# Patient Record
Sex: MALE | Race: WHITE | NOT HISPANIC OR LATINO | Employment: FULL TIME | URBAN - METROPOLITAN AREA
[De-identification: names, ages, dates, MRNs, and addresses within clinical notes are randomized per-mention and may not be internally consistent; named-entity substitution may affect disease eponyms.]

---

## 2017-01-04 ENCOUNTER — PATIENT MESSAGE (OUTPATIENT)
Dept: NEUROLOGY | Facility: HOSPITAL | Age: 62
End: 2017-01-04

## 2017-03-02 ENCOUNTER — LAB VISIT (OUTPATIENT)
Dept: LAB | Facility: HOSPITAL | Age: 62
End: 2017-03-02
Attending: INTERNAL MEDICINE
Payer: COMMERCIAL

## 2017-03-02 DIAGNOSIS — C18.7 ADENOCARCINOMA OF SIGMOID COLON: ICD-10-CM

## 2017-03-02 LAB
ALBUMIN SERPL BCP-MCNC: 3.9 G/DL
ALP SERPL-CCNC: 93 U/L
ALT SERPL W/O P-5'-P-CCNC: 20 U/L
ANION GAP SERPL CALC-SCNC: 11 MMOL/L
AST SERPL-CCNC: 16 U/L
BILIRUB SERPL-MCNC: 0.5 MG/DL
BUN SERPL-MCNC: 10 MG/DL
CALCIUM SERPL-MCNC: 9.8 MG/DL
CEA SERPL-MCNC: 2.6 NG/ML
CHLORIDE SERPL-SCNC: 106 MMOL/L
CO2 SERPL-SCNC: 21 MMOL/L
CREAT SERPL-MCNC: 1.1 MG/DL
ERYTHROCYTE [DISTWIDTH] IN BLOOD BY AUTOMATED COUNT: 13.4 %
EST. GFR  (AFRICAN AMERICAN): >60 ML/MIN/1.73 M^2
EST. GFR  (NON AFRICAN AMERICAN): >60 ML/MIN/1.73 M^2
GLUCOSE SERPL-MCNC: 83 MG/DL
HCT VFR BLD AUTO: 52 %
HGB BLD-MCNC: 17.8 G/DL
MCH RBC QN AUTO: 32.1 PG
MCHC RBC AUTO-ENTMCNC: 34.2 %
MCV RBC AUTO: 94 FL
NEUTROPHILS # BLD AUTO: 5.9 K/UL
PLATELET # BLD AUTO: 244 K/UL
PMV BLD AUTO: 10.2 FL
POTASSIUM SERPL-SCNC: 4 MMOL/L
PROT SERPL-MCNC: 7.4 G/DL
RBC # BLD AUTO: 5.55 M/UL
SODIUM SERPL-SCNC: 138 MMOL/L
WBC # BLD AUTO: 9.13 K/UL

## 2017-03-02 PROCEDURE — 82378 CARCINOEMBRYONIC ANTIGEN: CPT

## 2017-03-02 PROCEDURE — 36415 COLL VENOUS BLD VENIPUNCTURE: CPT | Mod: PO

## 2017-03-02 PROCEDURE — 80053 COMPREHEN METABOLIC PANEL: CPT | Mod: PO

## 2017-03-02 PROCEDURE — 85027 COMPLETE CBC AUTOMATED: CPT | Mod: PO

## 2017-03-03 ENCOUNTER — OFFICE VISIT (OUTPATIENT)
Dept: NEUROLOGY | Facility: HOSPITAL | Age: 62
End: 2017-03-03
Attending: INTERNAL MEDICINE
Payer: COMMERCIAL

## 2017-03-03 VITALS
BODY MASS INDEX: 31.34 KG/M2 | SYSTOLIC BLOOD PRESSURE: 125 MMHG | HEIGHT: 66 IN | TEMPERATURE: 97 F | WEIGHT: 195 LBS | DIASTOLIC BLOOD PRESSURE: 80 MMHG | HEART RATE: 73 BPM

## 2017-03-03 DIAGNOSIS — Z09 CHEMOTHERAPY FOLLOW-UP EXAMINATION: ICD-10-CM

## 2017-03-03 DIAGNOSIS — C77.9 METASTATIC ADENOCARCINOMA TO LYMPH NODE: ICD-10-CM

## 2017-03-03 DIAGNOSIS — K76.9 LIVER LESION: ICD-10-CM

## 2017-03-03 DIAGNOSIS — Z72.0 TOBACCO USE: ICD-10-CM

## 2017-03-03 DIAGNOSIS — C18.7 ADENOCARCINOMA OF SIGMOID COLON: Primary | ICD-10-CM

## 2017-03-03 PROCEDURE — 99214 OFFICE O/P EST MOD 30 MIN: CPT | Mod: ,,, | Performed by: INTERNAL MEDICINE

## 2017-03-03 PROCEDURE — 1160F RVW MEDS BY RX/DR IN RCRD: CPT | Mod: ,,, | Performed by: INTERNAL MEDICINE

## 2017-03-03 PROCEDURE — 99214 OFFICE O/P EST MOD 30 MIN: CPT | Performed by: INTERNAL MEDICINE

## 2017-03-03 NOTE — MR AVS SNAPSHOT
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  Call for this free service at [phone].             Medications                Verify that the below list of medications is an accurate representation of the medications you are currently taking.  If none reported, the list may be blank. If incorrect, please contact your healthcare provider. Carry this list with you in case of emergency.                Clinical Reference Information           Your Vitals Were     BP Pulse Temp Height Weight BMI    125/80 73 97.1 °F (36.2 °C) (Oral) 5' 6" (1.676 m) 88.5 kg (195 lb) 31.47 kg/m2      Blood Pressure          Most Recent Value    BP  125/80      Allergies as of 3/3/2017     Rice      Immunizations Administered on Date of Encounter - 3/3/2017     None      Orders Placed During Today's Visit     Future Labs/Procedures Expected by Expires    CT Abdomen Pelvis With Contrast  3/3/2017 3/3/2018    "   Instructions    F/U in 3 months    CT before visit       Smoking Cessation     If you would like to quit smoking:   You may be eligible for free services if you are a [place] resident and started smoking cigarettes before September 1, 1988.  Call the Smoking Cessation [employer] (SCT) toll free at (206) 816-8225 or (056) 204-3353.   Call 1-800-QUIT-NOW if you do not meet the above criteria.            Language Assistance Services     ATTENTION: Language assistance services are available, free of charge. Please call [phone].      ATENCIÓN: Si habla español, tiene a hagan disposición servicios gratuitos de asistencia lingüística. Llame al [phone].     [facility] Ý: N?u b?n nói Ti?ng Vi?t, có các d?ch v? h? tr? ngôn ng? mi?n phí dành cho b?n. G?i s? [phone].         [facility] complies with applicable Federal civil rights laws and does not discriminate on the basis of race, color, national origin, age, disability, or sex.

## 2017-03-03 NOTE — LETTER
March 3, 2017        Juan Church MD  [address]  [address]  [place] [address]             [facility] [address]  [place] [address]  Phone: [phone]  Fax: [phone]   Patient: Stu Lama   MR Number: [id number]   YOB: 1955   Date of Visit: 3/3/2017       Dear Dr. Church:    Thank you for referring Stu Lama to me for evaluation. Attached you will find relevant portions of my assessment and plan of care.    If you have questions, please do not hesitate to call me. I look forward to following Stu Lama along with you.    Sincerely,      Martin Knox, DO, FACP            CC  No Recipients    Enclosure

## 2017-03-03 NOTE — PROGRESS NOTES
[facility]OLAN[facility]:  [facility]  A collaboration between [facility] and [facility]    PATIENT: Stu Lama  MRN: [id number]  DATE: 3/3/2017      Diagnosis:   1. Adenocarcinoma of sigmoid colon    2. 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 Chemotherapy follow-up examination    4. Liver lesion    5. Tobacco use        Chief Complaint: Follow-up (review scans)      Oncologic History:      Oncologic History Sigmoid adenocarcinoma diagnosed 11/2015    Oncologic Treatment Low anterior resection 12/14/2015  XELOX initiated 2/23/16 (Oxaliplatin discontinued following 1 cycle due to severe neuropathy; Capecitabine dose reduced by 25% due to tremors). completed 8/2016     Pathology Moderately differentiated adenocarcinoma, positive lymphovascular invasion, positive perineural invasion, pT3, N1a          Subjective:    Interval History: Mr. Lama is a 61 y.o. male seen in follow up for colon cancer.  He states he generally feels well.  Still with ongoing numbness to his left hand and feet.  Despite this, he is fully active and was out fishing recently.  Denies any blood in stool or dark stools.  He is without other complaints.  He still continues to smoke.    His history dates to 11/15 when he underwent colonoscopy after having several weeks of abdominal pain and weight loss.  At that time he had also complained of intermittent hematochezia.  He was found to have a fungating partially obstructing mass in the sigmoid colon.  On 12/13/15 he underwent a low anterior resection.  Pathology from this revealed a moderately differentiated adenocarcinoma, 4 cm, lymphovascular invasion and perineural invasion with 1 out of 17 lymph nodes positive for metastatic disease. He was pathologically staged T3, N1.  He had followed up with oncology initially, however, wanted a second opinion at [facility].  He was initiated on XELOX on 2/23/16 and  required capecitabine dose reduction and oxaliplatin discontinuation following 1 cycle.  He completed 6 months of treatment in 8/2016.    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  He has a 112.50 pack-year smoking history. He does not have any smokeless tobacco history on file. He reports that he drinks about 0.6 oz of alcohol per week     Allergies:  Review of patient's allergies indicates:   Allergen Reactions    Rice Nausea And Vomiting       Medications:  No current outpatient prescriptions on file.     No current facility-administered medications for this visit.        Review of Systems   Constitutional: Negative for appetite change, chills, fatigue, fever and unexpected weight change.   HENT: Negative for dental problem, sinus pressure and sneezing.    Eyes: Negative for visual disturbance.   Respiratory: Negative for cough, choking and chest tightness.    Cardiovascular: Negative for chest pain and leg swelling.   Gastrointestinal: Negative for abdominal pain, blood in stool, constipation, diarrhea and nausea.   Genitourinary: Negative for difficulty urinating and dysuria.   Musculoskeletal: Negative for arthralgias and back pain.   Skin: Negative for rash and wound.   Neurological: Positive for numbness. Negative for  "dizziness, light-headedness and headaches.   Hematological: Negative for adenopathy. Does not bruise/bleed easily.   Psychiatric/Behavioral: Negative for sleep disturbance. The patient is not nervous/anxious.        ECOG Performance Status: 0   Objective:      Vitals:   Vitals:    03/03/17 0808   BP: 125/80   Pulse: 73   Temp: 97.1 °F (36.2 °C)   TempSrc: Oral   Weight: 88.5 kg (195 lb)   Height: 5' 6" (1.676 m)     BMI: Body mass index is 31.47 kg/(m^2).    Physical Exam   Constitutional: He is oriented to person, place, and time. He appears well-developed and well-nourished. No distress.   HENT:   Head: Normocephalic and atraumatic.   Eyes: Pupils are equal, round, and reactive to light. No scleral icterus.   Neck: Normal range of motion. Neck supple.   Cardiovascular: Normal rate and regular rhythm.    Pulmonary/Chest: Effort normal and breath sounds normal. No respiratory distress.   Abdominal: Soft. He exhibits no distension. There is no tenderness.   Musculoskeletal: Normal range of motion. He exhibits no edema.   Lymphadenopathy:     He has no cervical adenopathy.   Neurological: He is alert and oriented to person, place, and time. No cranial nerve deficit.   Skin: Skin is warm and dry.   Psychiatric: He has a normal mood and affect. His behavior is normal.       Laboratory Data:  Lab Visit on 03/02/2017   Component Date Value Ref Range Status    WBC 03/02/2017 9.13  3.90 - 12.70 K/uL Final    RBC 03/02/2017 5.55  4.60 - 6.20 M/uL Final    Hemoglobin 03/02/2017 17.8  14.0 - 18.0 g/dL Final    Hematocrit 03/02/2017 52.0  40.0 - 54.0 % Final    MCV 03/02/2017 94  82 - 98 fL Final    MCH 03/02/2017 32.1* 27.0 - 31.0 pg Final    MCHC 03/02/2017 34.2  32.0 - 36.0 % Final    RDW 03/02/2017 13.4  11.5 - 14.5 % Final    Platelets 03/02/2017 244  150 - 350 K/uL Final    MPV 03/02/2017 10.2  9.2 - 12.9 fL Final    Gran # 03/02/2017 5.9  1.8 - 7.7 K/uL Final    Comment: The ANC is based on a white cell " differential from an   automated cell counter. It has not been microscopically   reviewed for the presence of abnormal cells. Clinical   correlation is required.      Sodium 03/02/2017 138  136 - 145 mmol/L Final    Potassium 03/02/2017 4.0  3.5 - 5.1 mmol/L Final    Chloride 03/02/2017 106  95 - 110 mmol/L Final    CO2 03/02/2017 21* 23 - 29 mmol/L Final    Glucose 03/02/2017 83  70 - 110 mg/dL Final    BUN, Bld 03/02/2017 10  8 - 23 mg/dL Final    Creatinine 03/02/2017 1.1  0.5 - 1.4 mg/dL Final    Calcium 03/02/2017 9.8  8.7 - 10.5 mg/dL Final    Total Protein 03/02/2017 7.4  6.0 - 8.4 g/dL Final    Albumin 03/02/2017 3.9  3.5 - 5.2 g/dL Final    Total Bilirubin 03/02/2017 0.5  0.1 - 1.0 mg/dL Final    Comment: For infants and newborns, interpretation of results should be based  on gestational age, weight and in agreement with clinical  observations.  Premature Infant recommended reference ranges:  Up to 24 hours.............<8.0 mg/dL  Up to 48 hours............<12.0 mg/dL  3-5 days..................<15.0 mg/dL  6-29 days.................<15.0 mg/dL      Alkaline Phosphatase 03/02/2017 93  55 - 135 U/L Final    AST 03/02/2017 16  10 - 40 U/L Final    ALT 03/02/2017 20  10 - 44 U/L Final    Anion Gap 03/02/2017 11  8 - 16 mmol/L Final    eGFR if African American 03/02/2017 >60  >60 mL/min/1.73 m^2 Final    eGFR if non African American 03/02/2017 >60  >60 mL/min/1.73 m^2 Final    Comment: Calculation used to obtain the estimated glomerular filtration  rate (eGFR) is the CKD-EPI equation. Since race is unknown   in our information system, the eGFR values for   -American and Non--American patients are given   for each creatinine result.      CEA 03/02/2017 2.6  0.0 - 5.0 ng/mL Final    Comment: CEA Normal Range:  Non-Smokers: 0-3.0 ng/mL  Smokers:     0-5.0 ng/mL         Imaging:    CT 9/9/16  On the precontrast portion of the scan there is an ill-defined hypodense lesion within the  posterior segment right lobe of the liver measuring about 4.2 x 3.3 cm. On the arterial portion of the scan it demonstrates peripheral nodular enhancement. On the venous phase there is some mild nodular central enhancement. On the delayed images, this mass is mildly hyperdense to surrounding hepatic parenchyma with some central hypodensity. It is unchanged in size compared to previous CT.  There are a couple of additional subcentimeter hypodense foci noted medial dome of the liver seen only on the venous portion of the scan. There is an additional subcentimeter hypodense lesion within the periphery of the right lobe of the liver with mild peripheral enhancement.    Previously noted findings including arteriosclerotic disease, 14 mm right adrenal nodule, right extrarenal pelvis appearance suggestive of partial UPJ obstruction, mild diffuse bladder wall thickening and rounded hyperdense lesion layering in the dependent portion of the bladder measuring about 2.3 x 1.6 cm in diameter. No new significant osseous findings.   Impression    1. Stable appearance to enhancing mass in the posterior segment right lobe of the liver. Additional subcentimeter hypodense hepatic lesions with minimal or mild peripheral enhancement. Metastatic disease not excluded. MRI of the liver with Eovist is recommended in further evaluation in this patient with adenocarcinoma of the colon. A PET scan may also be of benefit in evaluating the dominant lesion in the right lobe of the liver.  2. Chronic findings detailed above are overall stable.                  Assessment:       1. Adenocarcinoma of sigmoid colon    2. 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 Chemotherapy follow-up examination    4. Liver lesion    5. Tobacco use           Plan:     Mr. Lama is doing well from an oncologic standpoint.  Recent colonoscopy did not reveal new disease.  Additionally he had a follow-up visit with urology for thickening on the bladder which was  seen on CT which was also nonrevealing for a malignancy.  He has 2 small liver lesions which are consistent with hemangioma but will continue to follow and will repeat a CT in 3 months.  I have had an extensive discussion with him today concerning smoking cessation and strongly encouraged him to smoke.  He is not yet ready but I have told him we have a smoking cessation program and if he changes his mind let us know.  Plan to see back in 3 months or sooner if needed.    Martin Knox DO, [facility]  Hematology & Oncology, [facility]  [address]  [place]  [address]  ph. [phone]; [phone]  fax. [phone]    25 minutes were spent in coordination of patient's care, record review and counseling.  More than 50% of the time was face-to-face.

## 2017-06-02 ENCOUNTER — TELEPHONE (OUTPATIENT)
Dept: RADIOLOGY | Facility: HOSPITAL | Age: 62
End: 2017-06-02

## 2017-06-02 ENCOUNTER — TELEPHONE (OUTPATIENT)
Dept: NEUROLOGY | Facility: HOSPITAL | Age: 62
End: 2017-06-02

## 2017-06-02 DIAGNOSIS — C18.9 ADENOCARCINOMA, COLON: Primary | ICD-10-CM

## 2017-06-02 NOTE — TELEPHONE ENCOUNTER
----- Message from Francisca Ingram sent at 6/2/2017  7:59 AM CDT -----  Regarding: CT Order  Mr Lama is scheduled fo CT Abdomen Pelvis With contrast tomorrow for Triple Phase Liver. There order that we use here for a Triple Phase Liver is CT ABDOMEN PELVIS WITH AND WITHOUT CONTRAST. Please change and link the new order to his current appointment.    Thanks,  Gema  [id number]

## 2017-06-05 ENCOUNTER — HOSPITAL ENCOUNTER (OUTPATIENT)
Dept: RADIOLOGY | Facility: HOSPITAL | Age: 62
Discharge: HOME | End: 2017-06-05
Attending: INTERNAL MEDICINE
Payer: COMMERCIAL

## 2017-06-05 DIAGNOSIS — C18.9 ADENOCARCINOMA, COLON: ICD-10-CM

## 2017-06-05 PROCEDURE — 25500020 PHARM REV CODE 255: Mod: PO | Performed by: INTERNAL MEDICINE

## 2017-06-05 PROCEDURE — 74178 CT ABD&PLV WO CNTR FLWD CNTR: CPT | Mod: TC,PO

## 2017-06-05 PROCEDURE — 74178 CT ABD&PLV WO CNTR FLWD CNTR: CPT | Mod: 26,,, | Performed by: RADIOLOGY

## 2017-06-05 RX ADMIN — IOHEXOL 15 ML: 350 INJECTION, SOLUTION INTRAVENOUS at 07:06

## 2017-06-05 RX ADMIN — IOHEXOL 100 ML: 350 INJECTION, SOLUTION INTRAVENOUS at 08:06

## 2017-06-16 ENCOUNTER — OFFICE VISIT (OUTPATIENT)
Dept: NEUROLOGY | Facility: HOSPITAL | Age: 62
End: 2017-06-16
Attending: INTERNAL MEDICINE
Payer: COMMERCIAL

## 2017-06-16 VITALS
WEIGHT: 196.19 LBS | SYSTOLIC BLOOD PRESSURE: 122 MMHG | DIASTOLIC BLOOD PRESSURE: 85 MMHG | HEIGHT: 67 IN | HEART RATE: 74 BPM | TEMPERATURE: 97 F | BODY MASS INDEX: 30.79 KG/M2

## 2017-06-16 DIAGNOSIS — G62.0 NEUROPATHY DUE TO CHEMOTHERAPEUTIC DRUG: ICD-10-CM

## 2017-06-16 DIAGNOSIS — T45.1X5A NEUROPATHY DUE TO CHEMOTHERAPEUTIC DRUG: ICD-10-CM

## 2017-06-16 DIAGNOSIS — C18.7 ADENOCARCINOMA OF SIGMOID COLON: Primary | ICD-10-CM

## 2017-06-16 DIAGNOSIS — R91.1 PULMONARY NODULE: ICD-10-CM

## 2017-06-16 PROCEDURE — 99214 OFFICE O/P EST MOD 30 MIN: CPT | Mod: ,,, | Performed by: INTERNAL MEDICINE

## 2017-06-16 PROCEDURE — 99214 OFFICE O/P EST MOD 30 MIN: CPT | Performed by: INTERNAL MEDICINE

## 2017-06-16 NOTE — LETTER
June 16, 2017        Juan Church MD  [address]  [address]  [place] [address]             [facility] [address]  [place] [address]  Phone: [phone]  Fax: [phone]   Patient: Stu Lama   MR Number: [id number]   YOB: 1955   Date of Visit: 6/16/2017       Dear Dr. Church:    Thank you for referring Stu Lama to me for evaluation. Attached you will find relevant portions of my assessment and plan of care.    If you have questions, please do not hesitate to call me. I look forward to following Stu Lama along with you.    Sincerely,      Martin Knox, DO, FACP            CC  No Recipients    Enclosure

## 2017-06-16 NOTE — PROGRESS NOTES
[place]OLAN[place]:  [place] [facility]  A collaboration between [facility] and [facility]    PATIENT: Stu Lama  MRN: [id number]  DATE: 6/16/2017      Diagnosis:   1. Adenocarcinoma of sigmoid colon    2. Pulmonary nodule    3. Neuropathy due to chemotherapeutic drug        Chief Complaint: Follow-up (follow up w/scans)      Oncologic History:      Oncologic History Sigmoid adenocarcinoma diagnosed 11/2015    Oncologic Treatment Low anterior resection 12/14/2015  XELOX initiated 2/23/16 (Oxaliplatin discontinued following 1 cycle due to severe neuropathy; Capecitabine dose reduced by 25% due to tremors). completed 8/2016     Pathology Moderately differentiated adenocarcinoma, positive lymphovascular invasion, positive perineural invasion, pT3, N1a          Subjective:    Interval History: Mr. Lama is a 61 y.o. male seen in follow up for colon cancer.  He states he generally feels well.  He still has ongoing numbness to his hands and feet which is relatively unchanged.  He has noted some ongoing fatigue throughout the day.  He has no other new complaints.    His history dates to 11/15 when he underwent colonoscopy after having several weeks of abdominal pain and weight loss.  At that time he had also complained of intermittent hematochezia.  He was found to have a fungating partially obstructing mass in the sigmoid colon.  On 12/13/15 he underwent a low anterior resection.  Pathology from this revealed a moderately differentiated adenocarcinoma, 4 cm, lymphovascular invasion and perineural invasion with 1 out of 17 lymph nodes positive for metastatic disease. He was pathologically staged T3, N1.  He had followed up with oncology initially, however, wanted a second opinion at [facility].  He was initiated on XELOX on 2/23/16 and required capecitabine dose reduction and oxaliplatin discontinuation following 1 cycle.  He completed 6  months of treatment in 8/2016.    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  He has a 112.50 pack-year smoking history. He does not have any smokeless tobacco history on file. He reports that he drinks about 0.6 oz of alcohol per week .    Allergies:  Review of patient's allergies indicates:   Allergen Reactions    Rice Nausea And Vomiting       Medications:  No current outpatient prescriptions on file.     No current facility-administered medications for this visit.        Review of Systems   Constitutional: Positive for fatigue. Negative for appetite change, chills, fever and unexpected weight change.   HENT: Negative for dental problem, sinus pressure and sneezing.    Eyes: Negative for visual disturbance.   Respiratory: Negative for cough, choking and chest tightness.    Cardiovascular: Negative for chest pain and leg swelling.   Gastrointestinal: Negative for abdominal pain, blood in stool, constipation, diarrhea and nausea.   Genitourinary: Negative for difficulty urinating and dysuria.   Musculoskeletal: Negative for arthralgias and back pain.   Skin: Negative for rash and wound.   Neurological: Positive for numbness. Negative for dizziness, light-headedness and headaches.   Hematological: Negative for adenopathy. Does not  "bruise/bleed easily.   Psychiatric/Behavioral: Negative for sleep disturbance. The patient is not nervous/anxious.        ECOG Performance Status: 0   Objective:      Vitals:   Vitals:    06/16/17 0823   BP: 122/85   Pulse: 74   Temp: 97.1 °F (36.2 °C)   TempSrc: Oral   Weight: 89 kg (196 lb 3.2 oz)   Height: 5' 7" (1.702 m)     BMI: Body mass index is 30.73 kg/m².    Physical Exam   Constitutional: He is oriented to person, place, and time. He appears well-developed and well-nourished. No distress.   HENT:   Head: Normocephalic and atraumatic.   Eyes: Pupils are equal, round, and reactive to light. No scleral icterus.   Neck: Normal range of motion. Neck supple.   Cardiovascular: Normal rate and regular rhythm.    Pulmonary/Chest: Effort normal and breath sounds normal. No respiratory distress.   Abdominal: Soft. He exhibits no distension. There is no tenderness.   Musculoskeletal: Normal range of motion. He exhibits no edema.   Lymphadenopathy:     He has no cervical adenopathy.   Neurological: He is alert and oriented to person, place, and time. No cranial nerve deficit.   Skin: Skin is warm and dry.   Psychiatric: He has a normal mood and affect. His behavior is normal.       Laboratory Data:  Lab Visit on 06/05/2017   Component Date Value Ref Range Status    WBC 06/05/2017 8.89  3.90 - 12.70 K/uL Final    RBC 06/05/2017 5.50  4.60 - 6.20 M/uL Final    Hemoglobin 06/05/2017 17.5  14.0 - 18.0 g/dL Final    Hematocrit 06/05/2017 51.6  40.0 - 54.0 % Final    MCV 06/05/2017 94  82 - 98 fL Final    MCH 06/05/2017 31.8* 27.0 - 31.0 pg Final    MCHC 06/05/2017 33.9  32.0 - 36.0 % Final    RDW 06/05/2017 13.6  11.5 - 14.5 % Final    Platelets 06/05/2017 260  150 - 350 K/uL Final    MPV 06/05/2017 10.2  9.2 - 12.9 fL Final    Gran # 06/05/2017 6.1  1.8 - 7.7 K/uL Final    Comment: The ANC is based on a white cell differential from an   automated cell counter. It has not been microscopically   reviewed for the " presence of abnormal cells. Clinical   correlation is required.      Sodium 06/05/2017 138  136 - 145 mmol/L Final    Potassium 06/05/2017 4.2  3.5 - 5.1 mmol/L Final    Chloride 06/05/2017 105  95 - 110 mmol/L Final    CO2 06/05/2017 25  23 - 29 mmol/L Final    Glucose 06/05/2017 94  70 - 110 mg/dL Final    BUN, Bld 06/05/2017 11  8 - 23 mg/dL Final    Creatinine 06/05/2017 1.2  0.5 - 1.4 mg/dL Final    Calcium 06/05/2017 9.4  8.7 - 10.5 mg/dL Final    Total Protein 06/05/2017 7.1  6.0 - 8.4 g/dL Final    Albumin 06/05/2017 3.8  3.5 - 5.2 g/dL Final    Total Bilirubin 06/05/2017 0.4  0.1 - 1.0 mg/dL Final    Comment: For infants and newborns, interpretation of results should be based  on gestational age, weight and in agreement with clinical  observations.  Premature Infant recommended reference ranges:  Up to 24 hours.............<8.0 mg/dL  Up to 48 hours............<12.0 mg/dL  3-5 days..................<15.0 mg/dL  6-29 days.................<15.0 mg/dL      Alkaline Phosphatase 06/05/2017 87  55 - 135 U/L Final    AST 06/05/2017 14  10 - 40 U/L Final    ALT 06/05/2017 23  10 - 44 U/L Final    Anion Gap 06/05/2017 8  8 - 16 mmol/L Final    eGFR if African American 06/05/2017 >60  >60 mL/min/1.73 m^2 Final    eGFR if non African American 06/05/2017 >60  >60 mL/min/1.73 m^2 Final    Comment: Calculation used to obtain the estimated glomerular filtration  rate (eGFR) is the CKD-EPI equation. Since race is unknown   in our information system, the eGFR values for   -American and Non--American patients are given   for each creatinine result.      CEA 06/05/2017 2.4  0.0 - 5.0 ng/mL Final    Comment: CEA Normal Range:  Non-Smokers: 0-3.0 ng/mL  Smokers:     0-5.0 ng/mL         Imaging:    CT 6/5/17  Comparison: 12/19/2016    Technique:  CT of the abdomen and pelvis was obtained.  100 cc of IV Omnipaque 350 was administered.Images of the abdomen were obtained prior to and following  contrast administration.  Multiplanar reconstructions were obtained and reviewed.    Findings:   Visualized Chest: There is a new noncalcified nodular opacity in the periphery of the right lower lobe measuring 4.6 mm as seen on image 17, series 2.  Other previously described nodular opacity in the posterior right lower lobe is unchanged measuring approximately 6 mm.    Abdomen:  Liver: The previously described 3.5 cm lesion in the posterior right hepatic lobe is unchanged.  Lesion again demonstrates peripheral interrupted nodular enhancement with gradual fill-in on delayed acquisitions, in keeping with a hemangioma.  Multiple subcentimeter hypoattenuating foci in the dome of the liver in the right and left hepatic lobes are also unchanged and remain too small to reliably characterize.  No new lesions visualized.  Gallbladder and biliary: Within normal limits.    Spleen: Within normal limits.    Pancreas: Within normal limits.    Adrenals: Slight nodular thickening of the adrenal glands is unchanged    Kidneys: Prominent extrarenal pelvis again noted on the right.  No hydronephrosis or hydroureter.  No enhancing renal masses visualized.    Bowel: Within normal limits.  No evidence of obstruction.  The appendix appears normal.    Peritoneum: No ascites or pneumoperitoneum.    Abdominal adenopathy: None.    Vasculature: Ectasia of the distal abdominal aorta is unchanged measuring up to 2.9 cm in maximum diameter just proximal to the bifurcation.    Pelvis:    Urinary bladder: Diffuse urinary bladder wall thickening is again demonstrated.  Rounded hyperattenuating foci at the apex of the urinary bladder is unchanged measuring approximately 15 mm in diameter.    Prostate and seminal vesicles: Within normal limits.    Pelvic adenopathy: None.    Bones: No acute findings.    Miscellaneous: None.   Impression       1.  Unchanged appearance of previously described lesion in the posterior right hepatic lobe which demonstrates  enhancement features in keeping with hemangioma.    2.  Numerous subcentimeter hypoattenuating foci throughout the liver are unchanged and remain too small to reliably characterize.    3.  New noncalcified pulmonary nodule in the right lower lobe measuring approximately 4.6 mm.  Metastatic disease not excluded.  Other previously described right lower lobe pulmonary nodular opacity is unchanged.  Consider further correlation with dedicated CT of the chest.    4.  Other stable findings as above.                      Assessment:       1. Adenocarcinoma of sigmoid colon    2. Pulmonary nodule    3. Neuropathy due to chemotherapeutic drug           Plan:     Mr. Lama is doing relatively well from an oncologic perspective.  Review of his abdominal imaging did not show any new intra-abdominal lesions.  What is concerning is that he has a subcentimeter, noncalcified right lower lobe nodule which was not seen on prior CT imaging.  I would plan to repeat a CT of the chest in another 3 months for further evaluation.  We did have a long discussion today about his fatigue and he does admit to snoring and may have a component of sleep apnea causing this.  He is going to follow back up with his primary care physician.  Follow back up with me in another 3 months or sooner if needed.    Martin Knox DO, [facility]  Hematology & Oncology, [facility]  [address]  [place]  [address]  ph. [phone]; [phone]  fax. [phone]    25 minutes were spent in coordination of patient's care, record review and counseling.  More than 50% of the time was face-to-face.

## 2017-11-27 ENCOUNTER — PATIENT MESSAGE (OUTPATIENT)
Dept: NEUROLOGY | Facility: HOSPITAL | Age: 62
End: 2017-11-27

## 2017-11-29 ENCOUNTER — TELEPHONE (OUTPATIENT)
Dept: NEUROLOGY | Facility: HOSPITAL | Age: 62
End: 2017-11-29

## 2017-11-29 DIAGNOSIS — C18.7 ADENOCARCINOMA OF SIGMOID COLON: Primary | ICD-10-CM

## 2017-12-06 ENCOUNTER — TELEPHONE (OUTPATIENT)
Dept: RADIOLOGY | Facility: HOSPITAL | Age: 62
End: 2017-12-06

## 2017-12-07 ENCOUNTER — HOSPITAL ENCOUNTER (OUTPATIENT)
Dept: RADIOLOGY | Facility: HOSPITAL | Age: 62
Discharge: HOME | End: 2017-12-07
Attending: INTERNAL MEDICINE
Payer: COMMERCIAL

## 2017-12-07 DIAGNOSIS — C18.7 ADENOCARCINOMA OF SIGMOID COLON: ICD-10-CM

## 2017-12-07 PROCEDURE — 74178 CT ABD&PLV WO CNTR FLWD CNTR: CPT | Mod: 26,,, | Performed by: RADIOLOGY

## 2017-12-07 PROCEDURE — 71260 CT THORAX DX C+: CPT | Mod: TC,PO

## 2017-12-07 PROCEDURE — 71260 CT THORAX DX C+: CPT | Mod: 26,,, | Performed by: RADIOLOGY

## 2017-12-07 PROCEDURE — 25500020 PHARM REV CODE 255: Mod: PO | Performed by: INTERNAL MEDICINE

## 2017-12-07 PROCEDURE — 74178 CT ABD&PLV WO CNTR FLWD CNTR: CPT | Mod: TC,PO

## 2017-12-07 RX ADMIN — IOHEXOL 30 ML: 350 INJECTION, SOLUTION INTRAVENOUS at 08:12

## 2017-12-07 RX ADMIN — IOHEXOL 100 ML: 350 INJECTION, SOLUTION INTRAVENOUS at 10:12

## 2017-12-12 ENCOUNTER — OFFICE VISIT (OUTPATIENT)
Dept: NEUROLOGY | Facility: HOSPITAL | Age: 62
End: 2017-12-12
Attending: INTERNAL MEDICINE
Payer: COMMERCIAL

## 2017-12-12 ENCOUNTER — TELEPHONE (OUTPATIENT)
Dept: NEUROLOGY | Facility: HOSPITAL | Age: 62
End: 2017-12-12

## 2017-12-12 VITALS
DIASTOLIC BLOOD PRESSURE: 94 MMHG | WEIGHT: 185.94 LBS | HEART RATE: 95 BPM | HEIGHT: 66 IN | BODY MASS INDEX: 29.88 KG/M2 | TEMPERATURE: 98 F | SYSTOLIC BLOOD PRESSURE: 130 MMHG

## 2017-12-12 DIAGNOSIS — T45.1X5A NEUROPATHY DUE TO CHEMOTHERAPEUTIC DRUG: ICD-10-CM

## 2017-12-12 DIAGNOSIS — G62.0 NEUROPATHY DUE TO CHEMOTHERAPEUTIC DRUG: ICD-10-CM

## 2017-12-12 DIAGNOSIS — R91.1 PULMONARY NODULE: ICD-10-CM

## 2017-12-12 DIAGNOSIS — C18.7 ADENOCARCINOMA OF SIGMOID COLON: Primary | ICD-10-CM

## 2017-12-12 DIAGNOSIS — Z72.0 TOBACCO USE: ICD-10-CM

## 2017-12-12 PROCEDURE — 99213 OFFICE O/P EST LOW 20 MIN: CPT | Performed by: INTERNAL MEDICINE

## 2017-12-12 PROCEDURE — 99214 OFFICE O/P EST MOD 30 MIN: CPT | Mod: ,,, | Performed by: INTERNAL MEDICINE

## 2017-12-12 NOTE — LETTER
December 12, 2017        Juan Church MD  [address]  [address]  [place] [address]             [facility] [address]  [place] [address]  Phone: [phone]  Fax: [phone]   Patient: Stu Lama   MR Number: [id number]   YOB: 1955   Date of Visit: 12/12/2017       Dear Dr. Church:    Thank you for referring Stu Lama to me for evaluation. Attached you will find relevant portions of my assessment and plan of care.    If you have questions, please do not hesitate to call me. I look forward to following Stu Lama along with you.    Sincerely,      Martin Knox, DO, FACP            CC  No Recipients    Enclosure

## 2017-12-12 NOTE — PATIENT INSTRUCTIONS
Follow up with labs and a CT in 6 months    Appointment with Dr. Abilio hollis, subject to change

## 2017-12-12 NOTE — PROGRESS NOTES
NOLANETS:  [facility]  A collaboration between [facility] and [facility]    PATIENT: Stu Lama  MRN: [id number]  DATE: 12/12/2017      Diagnosis:   1. Adenocarcinoma of sigmoid colon    2. Neuropathy due to chemotherapeutic drug    3. Pulmonary nodule    4. Tobacco use        Chief Complaint: Follow-up (review scans and labs)      Oncologic History:      Oncologic History Sigmoid adenocarcinoma diagnosed 11/2015    Oncologic Treatment Low anterior resection 12/14/2015  XELOX initiated 2/23/16 (Oxaliplatin discontinued following 1 cycle due to severe neuropathy; Capecitabine dose reduced by 25% due to tremors). completed 8/2016     Pathology Moderately differentiated adenocarcinoma, positive lymphovascular invasion, positive perineural invasion, pT3, N1a          Subjective:    Interval History: Mr. Lama is a 62 y.o. male seen in follow up for colon cancer.  Since I had seen him last he is undergone knee replacement surgery.  He states he has ongoing pain and difficulty ambulating because of this but this does appear to be improving.  He continues to smoke but has cut down.  He has no other new complaints.    His history dates to 11/15 when he underwent colonoscopy after having several weeks of abdominal pain and weight loss.  At that time he had also complained of intermittent hematochezia.  He was found to have a fungating partially obstructing mass in the sigmoid colon.  On 12/13/15 he underwent a low anterior resection.  Pathology from this revealed a moderately differentiated adenocarcinoma, 4 cm, lymphovascular invasion and perineural invasion with 1 out of 17 lymph nodes positive for metastatic disease. He was pathologically staged T3, N1.  He had followed up with oncology initially, however, wanted a second opinion at [facility].  He was initiated on XELOX on 2/23/16 and required capecitabine dose reduction and  oxaliplatin discontinuation following 1 cycle.  He completed 6 months of treatment in 8/2016.    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  He has a 112.50 pack-year smoking history. He does not have any smokeless tobacco history on file. He reports that he drinks about 0.6 oz of alcohol per week .    Allergies:  Review of patient's allergies indicates:   Allergen Reactions    Rice Nausea And Vomiting       Medications:  No current outpatient prescriptions on file.     No current facility-administered medications for this visit.        Review of Systems   Constitutional: Negative for appetite change, chills, fatigue, fever and unexpected weight change.   HENT: Negative for dental problem, sinus pressure and sneezing.    Eyes: Negative for visual disturbance.   Respiratory: Negative for cough, choking and chest tightness.    Cardiovascular: Negative for chest pain and leg swelling.   Gastrointestinal: Negative for abdominal pain, blood in stool, constipation, diarrhea and nausea.   Genitourinary: Negative for difficulty urinating and dysuria.   Musculoskeletal: Positive for arthralgias. Negative for back pain.   Skin: Negative for rash and wound.   Neurological: Positive for numbness. Negative for dizziness, light-headedness and  "headaches.   Hematological: Negative for adenopathy. Does not bruise/bleed easily.   Psychiatric/Behavioral: Negative for sleep disturbance. The patient is not nervous/anxious.        ECOG Performance Status: 0   Objective:      Vitals:   Vitals:    12/12/17 1031   BP: (!) 130/94   Pulse: 95   Temp: 97.5 °F (36.4 °C)   TempSrc: Oral   Weight: 84.4 kg (185 lb 15.3 oz)   Height: 5' 6" (1.676 m)     BMI: Body mass index is 30.01 kg/m².    Physical Exam   Constitutional: He is oriented to person, place, and time. He appears well-developed and well-nourished. No distress.   HENT:   Head: Normocephalic and atraumatic.   Eyes: Pupils are equal, round, and reactive to light. No scleral icterus.   Neck: Normal range of motion. Neck supple.   Cardiovascular: Normal rate and regular rhythm.    Pulmonary/Chest: Effort normal and breath sounds normal. No respiratory distress.   Abdominal: Soft. He exhibits no distension. There is no tenderness.   Musculoskeletal: Normal range of motion. He exhibits no edema.   Lymphadenopathy:     He has no cervical adenopathy.   Neurological: He is alert and oriented to person, place, and time. No cranial nerve deficit.   Skin: Skin is warm and dry.   Psychiatric: He has a normal mood and affect. His behavior is normal.       Laboratory Data:  Lab Visit on 12/07/2017   Component Date Value Ref Range Status    WBC 12/07/2017 13.04* 3.90 - 12.70 K/uL Final    RBC 12/07/2017 5.01  4.60 - 6.20 M/uL Final    Hemoglobin 12/07/2017 15.9  14.0 - 18.0 g/dL Final    Hematocrit 12/07/2017 47.4  40.0 - 54.0 % Final    MCV 12/07/2017 95  82 - 98 fL Final    MCH 12/07/2017 31.7* 27.0 - 31.0 pg Final    MCHC 12/07/2017 33.5  32.0 - 36.0 g/dL Final    RDW 12/07/2017 13.8  11.5 - 14.5 % Final    Platelets 12/07/2017 309  150 - 350 K/uL Final    MPV 12/07/2017 10.0  9.2 - 12.9 fL Final    Gran # 12/07/2017 8.3* 1.8 - 7.7 K/uL Final    Comment: The ANC is based on a white cell differential from an "   automated cell counter. It has not been microscopically   reviewed for the presence of abnormal cells. Clinical   correlation is required.      Sodium 12/07/2017 139  136 - 145 mmol/L Final    Potassium 12/07/2017 3.8  3.5 - 5.1 mmol/L Final    Chloride 12/07/2017 104  95 - 110 mmol/L Final    CO2 12/07/2017 23  23 - 29 mmol/L Final    Glucose 12/07/2017 86  70 - 110 mg/dL Final    BUN, Bld 12/07/2017 16  8 - 23 mg/dL Final    Creatinine 12/07/2017 1.2  0.5 - 1.4 mg/dL Final    Calcium 12/07/2017 9.5  8.7 - 10.5 mg/dL Final    Total Protein 12/07/2017 7.4  6.0 - 8.4 g/dL Final    Albumin 12/07/2017 3.9  3.5 - 5.2 g/dL Final    Total Bilirubin 12/07/2017 0.2  0.1 - 1.0 mg/dL Final    Comment: For infants and newborns, interpretation of results should be based  on gestational age, weight and in agreement with clinical  observations.  Premature Infant recommended reference ranges:  Up to 24 hours.............<8.0 mg/dL  Up to 48 hours............<12.0 mg/dL  3-5 days..................<15.0 mg/dL  6-29 days.................<15.0 mg/dL      Alkaline Phosphatase 12/07/2017 89  55 - 135 U/L Final    AST 12/07/2017 13  10 - 40 U/L Final    ALT 12/07/2017 19  10 - 44 U/L Final    Anion Gap 12/07/2017 12  8 - 16 mmol/L Final    eGFR if African American 12/07/2017 >60  >60 mL/min/1.73 m^2 Final    eGFR if non African American 12/07/2017 >60  >60 mL/min/1.73 m^2 Final    Comment: Calculation used to obtain the estimated glomerular filtration  rate (eGFR) is the CKD-EPI equation.       CEA 12/07/2017 2.6  0.0 - 5.0 ng/mL Final    Comment: CEA Normal Range:  Non-Smokers: 0-3.0 ng/mL  Smokers:     0-5.0 ng/mL         Imaging:      CT 12/7/17  CT of the chest abdomen and pelvis with contrast.    Comparison: Study from 08/11/2016    Technique:CT of the chest, abdomen and pelvis was acquired helically from the lung apices through the ischial tuberosities status post administration of 100 cc of Omnipaque 350. Oral  contrast was administered. Axial and reformatted images were reviewed.     Findings:    CHEST    There is a stable tiny less than 5 mm nodule within the right upper lobe.  There are additional stable less than tiny 2 mm nodule is also present within the right upper lobe.  Dependent changes noted within both lungs.  No pleural effusions or infiltrates.    ABDOMEN    Liver/gallbladder/biliary:There is a lesion within the posterior segment of the right hepatic lobe that demonstrates some peripheral arterial phase enhancement and is more hypodense centrally on the portal venous phase and measures approximate 3.6 x 3.0 cm.  There are 2 additional tiny hypodense subcentimeter lesions within the dome of the liver are better stable one located within the medial segment of the left hepatic lobe measuring up to 4 mm and the other within segment 8 and measuring approximately 5 mm.  There is also a cyst stable tiny subcapsular hypodensity within the right hepatic lobe that is also stable more inferiorly now measures approximately 4 mm there is also an additional lesion within the medial segment of the left hepatic lobe that is also stable and measures 7 mm.No biliary ductal dilation.    Pancreas:The pancreas is unremarkable in appearance.    Spleen:The spleen is not enlarged.    Adrenals:Stable right adrenal gland nodule most consistent with an adenoma.    Kidneys:The kidneys are equally perfused and demonstrate no solid masses. Prominent extrarenal pelvis noted on the right.    Bowel/Mesentery:There is no evidence of bowel obstruction. Normal-appearing appendix is noted. No mesenteric stranding or adenopathy.    Retroperitoneum:No adenopathy.There is a stable infrarenal abdominal aortic aneurysm just above the level the bifurcation and measures up to 3 cm.    PELVIS:    Genitourinary/Reproductive organs:Stable hyperdensities near the base of the bladder.    Adenopathy:None technique  Free Fluid:No free fluid    Osseus  Structures/Soft tissues:No suspicious appearing osseus lesions.  Stable intramuscular lipoma noted within the right upper back..   Impression         1. Stable tiny unchanged 5 mm smaller upper lobe nodules.    2.  Stable mass within the right hepatic lobe.  Additional tiny hypodensities scattered throughout the right and left hepatic lobes also stable.    3.  Stable right adrenal gland nodule most consistent with an adenoma.    4.  Remaining findings as discussed above in stable swallow.                          Assessment:       1. Adenocarcinoma of sigmoid colon    2. Neuropathy due to chemotherapeutic drug    3. Pulmonary nodule    4. Tobacco use           Plan:     Mr. Lama is doing well from an oncologic perspective.  Review of his images showed no evidence of recurrent disease.  He will need ongoing surveillance as he does have some subcentimeter pulmonary nodules.  I would recommend repeat imaging including a CT of the chest, abdomen and pelvis in 6 months.  He still has ongoing neuropathy due to his 1 dose of oxaliplatin which has not changed and is unlikely to resolve.  I have again encouraged him to quit smoking.  He will see me again in 6 months or sooner if needed.  All questions were answered and he is agreeable with this plan.      Martin Knox DO, FACP  Hematology & Oncology, [facility]  [address], [address]  [place]  [address]  ph. [phone]; [phone]  fax. [phone]    25 minutes were spent in coordination of patient's care, record review and counseling.  More than 50% of the time was face-to-face.

## 2018-06-06 ENCOUNTER — TELEPHONE (OUTPATIENT)
Dept: RADIOLOGY | Facility: HOSPITAL | Age: 63
End: 2018-06-06

## 2018-06-07 ENCOUNTER — HOSPITAL ENCOUNTER (OUTPATIENT)
Dept: RADIOLOGY | Facility: HOSPITAL | Age: 63
Discharge: HOME | End: 2018-06-07
Attending: INTERNAL MEDICINE
Payer: COMMERCIAL

## 2018-06-07 DIAGNOSIS — R91.1 PULMONARY NODULE: ICD-10-CM

## 2018-06-07 DIAGNOSIS — C18.7 ADENOCARCINOMA OF SIGMOID COLON: ICD-10-CM

## 2018-06-07 PROCEDURE — 71260 CT THORAX DX C+: CPT | Mod: 26,,, | Performed by: RADIOLOGY

## 2018-06-07 PROCEDURE — 74178 CT ABD&PLV WO CNTR FLWD CNTR: CPT | Mod: 26,,, | Performed by: RADIOLOGY

## 2018-06-07 PROCEDURE — 25500020 PHARM REV CODE 255: Mod: PO | Performed by: INTERNAL MEDICINE

## 2018-06-07 PROCEDURE — 71260 CT THORAX DX C+: CPT | Mod: TC,PO

## 2018-06-07 PROCEDURE — 74178 CT ABD&PLV WO CNTR FLWD CNTR: CPT | Mod: TC,PO

## 2018-06-07 RX ADMIN — IOHEXOL 100 ML: 350 INJECTION, SOLUTION INTRAVENOUS at 08:06

## 2018-06-07 RX ADMIN — IOHEXOL 30 ML: 350 INJECTION, SOLUTION INTRAVENOUS at 07:06

## 2018-06-12 ENCOUNTER — OFFICE VISIT (OUTPATIENT)
Dept: NEUROLOGY | Facility: HOSPITAL | Age: 63
End: 2018-06-12
Attending: INTERNAL MEDICINE
Payer: COMMERCIAL

## 2018-06-12 ENCOUNTER — TELEPHONE (OUTPATIENT)
Dept: NEUROLOGY | Facility: HOSPITAL | Age: 63
End: 2018-06-12

## 2018-06-12 VITALS
WEIGHT: 191.56 LBS | HEIGHT: 67 IN | OXYGEN SATURATION: 97 % | TEMPERATURE: 98 F | HEART RATE: 69 BPM | BODY MASS INDEX: 30.07 KG/M2 | DIASTOLIC BLOOD PRESSURE: 82 MMHG | SYSTOLIC BLOOD PRESSURE: 131 MMHG

## 2018-06-12 DIAGNOSIS — T45.1X5A NEUROPATHY DUE TO CHEMOTHERAPEUTIC DRUG: ICD-10-CM

## 2018-06-12 DIAGNOSIS — C18.7 ADENOCARCINOMA OF SIGMOID COLON: Primary | ICD-10-CM

## 2018-06-12 DIAGNOSIS — R91.1 PULMONARY NODULE: ICD-10-CM

## 2018-06-12 DIAGNOSIS — G62.0 NEUROPATHY DUE TO CHEMOTHERAPEUTIC DRUG: ICD-10-CM

## 2018-06-12 PROCEDURE — 3008F BODY MASS INDEX DOCD: CPT | Mod: CPTII,,, | Performed by: INTERNAL MEDICINE

## 2018-06-12 PROCEDURE — 99214 OFFICE O/P EST MOD 30 MIN: CPT | Mod: ,,, | Performed by: INTERNAL MEDICINE

## 2018-06-12 PROCEDURE — 99214 OFFICE O/P EST MOD 30 MIN: CPT | Performed by: INTERNAL MEDICINE

## 2018-06-12 NOTE — PROGRESS NOTES
NOLANETS:  [facility]  A collaboration between [facility] and [facility]    PATIENT: Stu Lama  MRN: [id number]  DATE: 6/12/2018      Diagnosis:   1. Adenocarcinoma of sigmoid colon    2. Neuropathy due to chemotherapeutic drug    3. Pulmonary nodule        Chief Complaint: Follow-up (review scan and labs/ 6 month follow up)      Oncologic History:      Oncologic History Sigmoid adenocarcinoma diagnosed 11/2015    Oncologic Treatment Low anterior resection 12/14/2015  XELOX initiated 2/23/16 (Oxaliplatin discontinued following 1 cycle due to severe neuropathy; Capecitabine dose reduced by 25% due to tremors). completed 8/2016     Pathology Moderately differentiated adenocarcinoma, positive lymphovascular invasion, positive perineural invasion, pT3, N1a          Subjective:    Interval History: Mr. Lama is a 62 y.o. male seen in follow up for colon cancer.  He states that he generally has been feeling well and has noted some fatigue.  He is also noted some left intermittent abdominal pain.  No nausea, vomiting, diarrhea.  Continues to smoke but has cut down and is at less than 2 packs per day.  He still has persistent left hand numbness.  No other new complaints.      His history dates to 11/15 when he underwent colonoscopy after having several weeks of abdominal pain and weight loss.  At that time he had also complained of intermittent hematochezia.  He was found to have a fungating partially obstructing mass in the sigmoid colon.  On 12/13/15 he underwent a low anterior resection.  Pathology from this revealed a moderately differentiated adenocarcinoma, 4 cm, lymphovascular invasion and perineural invasion with 1 out of 17 lymph nodes positive for metastatic disease. He was pathologically staged T3, N1.  He had followed up with oncology initially, however, wanted a second opinion at [facility].  He was initiated on  XELOX on 2/23/16 and required capecitabine dose reduction and oxaliplatin discontinuation following 1 cycle.  He completed 6 months of treatment in 8/2016.    Past Medical History:   Past Medical History:   Diagnosis Date    Adenocarcinoma of sigmoid colon 2/16/2016    Chemotherapy follow-up examination 4/5/2016    Hyperlipidemia     Liver lesion 9/6/2016    Metastatic adenocarcinoma to lymph node 2/16/2016    Neuropathy due to chemotherapeutic drug 3/15/2016    Pulmonary nodule 3/15/2016    Rash 5/17/2016    Tobacco use 2/16/2016       Past Surgical HIstory:   Past Surgical History:   Procedure Laterality Date    COLON SURGERY      KNEE ARTHROSCOPY W/ DEBRIDEMENT      PROSTATE SURGERY      TONSILLECTOMY         Family History:   Family History   Problem Relation Age of Onset    Cancer Mother         Breast       Social History:  reports that he has been smoking Cigarettes.  He has a 112.50 pack-year smoking history. He does not have any smokeless tobacco history on file. He reports that he drinks about 0.6 oz of alcohol per week .    Allergies:  Review of patient's allergies indicates:   Allergen Reactions    Rice Nausea And Vomiting       Medications:  No current outpatient prescriptions on file.     No current facility-administered medications for this visit.        Review of Systems   Constitutional: Negative for appetite change, chills, fatigue, fever and unexpected weight change.   HENT: Negative for dental problem, sinus pressure and sneezing.    Eyes: Negative for visual disturbance.   Respiratory: Negative for cough, choking and chest tightness.    Cardiovascular: Negative for chest pain and leg swelling.   Gastrointestinal: Negative for abdominal pain, blood in stool, constipation, diarrhea and nausea.   Genitourinary: Negative for difficulty urinating and dysuria.   Musculoskeletal: Positive for arthralgias. Negative for back pain.   Skin: Negative for rash and wound.   Neurological:  "Positive for numbness. Negative for dizziness, light-headedness and headaches.   Hematological: Negative for adenopathy. Does not bruise/bleed easily.   Psychiatric/Behavioral: Negative for sleep disturbance. The patient is not nervous/anxious.        ECOG Performance Status: 0   Objective:      Vitals:   Vitals:    06/12/18 0950   Weight: 86.9 kg (191 lb 9.3 oz)   Height: 5' 7" (1.702 m)     BMI: Body mass index is 30.01 kg/m².    Physical Exam   Constitutional: He is oriented to person, place, and time. He appears well-developed and well-nourished. No distress.   HENT:   Head: Normocephalic and atraumatic.   Eyes: Pupils are equal, round, and reactive to light. No scleral icterus.   Neck: Normal range of motion. Neck supple.   Cardiovascular: Normal rate and regular rhythm.    Pulmonary/Chest: Effort normal and breath sounds normal. No respiratory distress.   Abdominal: Soft. He exhibits no distension. There is no tenderness.   Musculoskeletal: Normal range of motion. He exhibits no edema.   Lymphadenopathy:     He has no cervical adenopathy.   Neurological: He is alert and oriented to person, place, and time. No cranial nerve deficit.   Skin: Skin is warm and dry.   Psychiatric: He has a normal mood and affect. His behavior is normal.       Laboratory Data:  Lab Visit on 06/07/2018   Component Date Value Ref Range Status    WBC 06/07/2018 9.97  3.90 - 12.70 K/uL Final    RBC 06/07/2018 5.64  4.60 - 6.20 M/uL Final    Hemoglobin 06/07/2018 17.7  14.0 - 18.0 g/dL Final    Hematocrit 06/07/2018 51.6  40.0 - 54.0 % Final    MCV 06/07/2018 92  82 - 98 fL Final    MCH 06/07/2018 31.4* 27.0 - 31.0 pg Final    MCHC 06/07/2018 34.3  32.0 - 36.0 g/dL Final    RDW 06/07/2018 14.3  11.5 - 14.5 % Final    Platelets 06/07/2018 267  150 - 350 K/uL Final    MPV 06/07/2018 10.6  9.2 - 12.9 fL Final    Gran # (ANC) 06/07/2018 7.0  1.8 - 7.7 K/uL Final    Comment: The ANC is based on a white cell differential from an "   automated cell counter. It has not been microscopically   reviewed for the presence of abnormal cells. Clinical   correlation is required.      Sodium 06/07/2018 137  136 - 145 mmol/L Final    Potassium 06/07/2018 4.0  3.5 - 5.1 mmol/L Final    Chloride 06/07/2018 107  95 - 110 mmol/L Final    CO2 06/07/2018 19* 23 - 29 mmol/L Final    Glucose 06/07/2018 97  70 - 110 mg/dL Final    BUN, Bld 06/07/2018 13  8 - 23 mg/dL Final    Creatinine 06/07/2018 1.1  0.5 - 1.4 mg/dL Final    Calcium 06/07/2018 9.6  8.7 - 10.5 mg/dL Final    Total Protein 06/07/2018 7.3  6.0 - 8.4 g/dL Final    Albumin 06/07/2018 3.9  3.5 - 5.2 g/dL Final    Total Bilirubin 06/07/2018 0.5  0.1 - 1.0 mg/dL Final    Comment: For infants and newborns, interpretation of results should be based  on gestational age, weight and in agreement with clinical  observations.  Premature Infant recommended reference ranges:  Up to 24 hours.............<8.0 mg/dL  Up to 48 hours............<12.0 mg/dL  3-5 days..................<15.0 mg/dL  6-29 days.................<15.0 mg/dL      Alkaline Phosphatase 06/07/2018 97  55 - 135 U/L Final    AST 06/07/2018 12  10 - 40 U/L Final    ALT 06/07/2018 12  10 - 44 U/L Final    Anion Gap 06/07/2018 11  8 - 16 mmol/L Final    eGFR if African American 06/07/2018 >60  >60 mL/min/1.73 m^2 Final    eGFR if non African American 06/07/2018 >60  >60 mL/min/1.73 m^2 Final    Comment: Calculation used to obtain the estimated glomerular filtration  rate (eGFR) is the CKD-EPI equation.       CEA 06/07/2018 2.5  0.0 - 5.0 ng/mL Final    Comment: CEA Normal Range:  Non-Smokers: 0-3.0 ng/mL  Smokers:     0-5.0 ng/mL         Imaging:    CT 6/7/18  COMPARISON:  12/07/2017    FINDINGS:  Chest:    Heart and great vessels: Mild degree of scattered atherosclerotic plaque noted involving the thoracic aorta and aortic branch vessels, including the coronary arteries.  No pericardial effusion.    Adenopathy: None  demonstrated.    Lungs: Previously described subtle noncalcified nodules in the right upper lobe are unchanged with the largest measuring up to 5 mm as seen on axial image 73, series 302.  Several 2-3 mm micro nodules also noted in the left upper lobe which are retrospectively unchanged.  No new pulmonary nodules demonstrated.  No parenchymal consolidations or pleural effusions.    Abdomen:    Liver: Previously described  lesion in the posterior right hepatic lobe appears unchanged measuring 3.6 cm with peripheral nodular enhancement again demonstrated with some central fill-in seen on the delayed acquisition.  Findings remain suggestive of hemangioma.  Multiple additional subcentimeter hypoattenuating foci throughout the liver also appear unchanged.  No new lesions visualized.    Gallbladder and biliary: Within normal limits.    Spleen: Within normal limits.    Pancreas: Within normal limits.    Adrenals: Small right adrenal nodule is unchanged and again favored to represent a small adenoma.    Kidneys: Prominent extrarenal pelvis again noted on the right.    Bowel: Within normal limits.  No evidence of obstruction.    Peritoneum: No ascites or pneumoperitoneum.    Abdominal Adenopathy: None    Vasculature: Mild aneurysmal dilatation of the infrarenal abdominal aorta appears unchanged measuring up to 3 cm in diameter.  Large amount of atherosclerotic plaque again noted throughout the abdomen and pelvis.    Pelvis:    Urinary bladder: Unremarkable.    Prostate and seminal vesicles: Within normal limits.    Pelvis adenopathy: None.    Bones: No acute findings.    Miscellaneous: None.   Impression       Stable exam with unchanged appearance of the lesion in the right hepatic lobe which demonstrates features suggestive of a hemangioma.  Multiple additional subcentimeter hypoattenuating lesions throughout the liver are also unchanged with no new liver lesions visualized.    Multiple small upper lobe pulmonary nodules  are unchanged measuring up to 5 mm on the right.    Stable right adrenal nodule, most in keeping with adenoma.            Assessment:       1. Adenocarcinoma of sigmoid colon    2. Neuropathy due to chemotherapeutic drug    3. Pulmonary nodule           Plan:     Mr. Lama continues to do well from an oncologic standpoint and review of his CT shows no evidence of recurrent disease.  He will need ongoing surveillance and will plan to repeat a CT and labs in another 6 months.  I have again counseled him on smoking cessation.  We have also discussed his neuropathy and he is not interested at this time in starting on any medications.  See me in 6 months or sooner if needed.  All questions were answered and he is agreeable with this plan.     Martin Knox DO, FACP  Hematology & Oncology, [facility]  [address], [address]  [place]  [address]  ph. [phone]; [phone]  fax. [phone]    25 minutes were spent in coordination of patient's care, record review and counseling.  More than 50% of the time was face-to-face.

## 2018-06-12 NOTE — LETTER
June 12, 2018        Juan Church MD  [address]  [address]  [place] [address]             [facility] [address]  [place] [address]  Phone: [phone]  Fax: [phone]   Patient: Stu Lama   MR Number: [id number]   YOB: 1955   Date of Visit: 6/12/2018       Dear Dr. Church:    Thank you for referring Stu Lama to me for evaluation. Attached you will find relevant portions of my assessment and plan of care.    If you have questions, please do not hesitate to call me. I look forward to following Stu Lama along with you.    Sincerely,      Martin Knox, DO, FACP            CC  No Recipients    Enclosure

## 2018-12-03 ENCOUNTER — TELEPHONE (OUTPATIENT)
Dept: RADIOLOGY | Facility: HOSPITAL | Age: 63
End: 2018-12-03

## 2018-12-04 ENCOUNTER — HOSPITAL ENCOUNTER (OUTPATIENT)
Dept: RADIOLOGY | Facility: HOSPITAL | Age: 63
Discharge: HOME | End: 2018-12-04
Attending: INTERNAL MEDICINE
Payer: COMMERCIAL

## 2018-12-04 DIAGNOSIS — R91.1 PULMONARY NODULE: ICD-10-CM

## 2018-12-04 DIAGNOSIS — C18.7 ADENOCARCINOMA OF SIGMOID COLON: ICD-10-CM

## 2018-12-04 PROCEDURE — 74178 CT ABD&PLV WO CNTR FLWD CNTR: CPT | Mod: 26,,, | Performed by: RADIOLOGY

## 2018-12-04 PROCEDURE — 71260 CT THORAX DX C+: CPT | Mod: TC,PO

## 2018-12-04 PROCEDURE — 71260 CT THORAX DX C+: CPT | Mod: 26,,, | Performed by: RADIOLOGY

## 2018-12-04 PROCEDURE — 74178 CT ABD&PLV WO CNTR FLWD CNTR: CPT | Mod: TC,PO

## 2018-12-04 PROCEDURE — 25500020 PHARM REV CODE 255: Mod: PO | Performed by: INTERNAL MEDICINE

## 2018-12-04 RX ADMIN — IOHEXOL 100 ML: 350 INJECTION, SOLUTION INTRAVENOUS at 09:12

## 2018-12-04 RX ADMIN — IOHEXOL 16 ML: 350 INJECTION, SOLUTION INTRAVENOUS at 07:12

## 2018-12-11 ENCOUNTER — OFFICE VISIT (OUTPATIENT)
Dept: NEUROLOGY | Facility: HOSPITAL | Age: 63
End: 2018-12-11
Attending: INTERNAL MEDICINE
Payer: COMMERCIAL

## 2018-12-11 VITALS
HEIGHT: 66 IN | WEIGHT: 192.56 LBS | OXYGEN SATURATION: 99 % | DIASTOLIC BLOOD PRESSURE: 69 MMHG | SYSTOLIC BLOOD PRESSURE: 115 MMHG | BODY MASS INDEX: 30.95 KG/M2 | HEART RATE: 82 BPM | TEMPERATURE: 97 F

## 2018-12-11 DIAGNOSIS — C18.7 ADENOCARCINOMA OF SIGMOID COLON: Primary | ICD-10-CM

## 2018-12-11 DIAGNOSIS — C77.9 METASTATIC ADENOCARCINOMA TO LYMPH NODE: ICD-10-CM

## 2018-12-11 DIAGNOSIS — Z72.0 TOBACCO USE: ICD-10-CM

## 2018-12-11 PROCEDURE — 99214 OFFICE O/P EST MOD 30 MIN: CPT | Mod: ,,, | Performed by: INTERNAL MEDICINE

## 2018-12-11 PROCEDURE — 99214 OFFICE O/P EST MOD 30 MIN: CPT | Performed by: INTERNAL MEDICINE

## 2018-12-11 PROCEDURE — 3008F BODY MASS INDEX DOCD: CPT | Mod: CPTII,,, | Performed by: INTERNAL MEDICINE

## 2018-12-11 NOTE — LETTER
December 11, 2018        Juan Church MD  [address]  [place] [address]             [facility] [address]  [place] [address]  Phone: [phone]  Fax: [phone]   Patient: Stu Lama   MR Number: [id number]   YOB: 1955   Date of Visit: 12/11/2018       Dear Dr. Church:    Thank you for referring Stu Lama to me for evaluation. Attached you will find relevant portions of my assessment and plan of care.    If you have questions, please do not hesitate to call me. I look forward to following Stu Lama along with you.    Sincerely,      Martin Knox, DO, FACP            CC  No Recipients    Enclosure

## 2018-12-11 NOTE — PROGRESS NOTES
NOLANETS:  [facility]  A collaboration between [facility] and [facility]    PATIENT: Stu Lama  MRN: [id number]  DATE: 12/11/2018      Diagnosis:   1. Adenocarcinoma of sigmoid colon    2. 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 Tobacco use        Chief Complaint: Follow-up (review scans and labs/ 6 month follow up)      Oncologic History:      Oncologic History Sigmoid adenocarcinoma diagnosed 11/2015    Oncologic Treatment Low anterior resection 12/14/2015  XELOX initiated 2/23/16 (Oxaliplatin discontinued following 1 cycle due to severe neuropathy; Capecitabine dose reduced by 25% due to tremors). completed 8/2016     Pathology Moderately differentiated adenocarcinoma, positive lymphovascular invasion, positive perineural invasion, pT3, N1a          Subjective:    Interval History: Mr. Lama is a 63 y.o. male seen in follow up for colon cancer.  He states he has generally been feeling well. He does note some intermittent fatigue.  He continues to smoke but continues to try to cut down.  Numbness is unchanged.  He has no other new complaints.      His history dates to 11/15 when he underwent colonoscopy after having several weeks of abdominal pain and weight loss.  At that time he had also complained of intermittent hematochezia.  He was found to have a fungating partially obstructing mass in the sigmoid colon.  On 12/13/15 he underwent a low anterior resection.  Pathology from this revealed a moderately differentiated adenocarcinoma, 4 cm, lymphovascular invasion and perineural invasion with 1 out of 17 lymph nodes positive for metastatic disease. He was pathologically staged T3, N1.  He had followed up with oncology initially, however, wanted a second opinion at [facility].  He was initiated on XELOX on 2/23/16 and required capecitabine dose reduction and oxaliplatin discontinuation following 1 cycle.  He  completed 6 months of treatment in 8/2016.    Past Medical History:   Past Medical History:   Diagnosis Date    Adenocarcinoma of sigmoid colon 2/16/2016    Chemotherapy follow-up examination 4/5/2016    Hyperlipidemia     Liver lesion 9/6/2016    Metastatic adenocarcinoma to lymph node 2/16/2016    Neuropathy due to chemotherapeutic drug 3/15/2016    Pulmonary nodule 3/15/2016    Rash 5/17/2016    Tobacco use 2/16/2016       Past Surgical HIstory:   Past Surgical History:   Procedure Laterality Date    COLON SURGERY      KNEE ARTHROSCOPY W/ DEBRIDEMENT      PROSTATE SURGERY      TONSILLECTOMY         Family History:   Family History   Problem Relation Age of Onset    Cancer Mother         Breast       Social History:  reports that he has been smoking cigarettes.  He has a 112.50 pack-year smoking history. He does not have any smokeless tobacco history on file. He reports that he drinks about 0.6 oz of alcohol per week.    Allergies:  Review of patient's allergies indicates:   Allergen Reactions    Rice Nausea And Vomiting       Medications:  No current outpatient medications on file.     No current facility-administered medications for this visit.        Review of Systems   Constitutional: Positive for fatigue. Negative for appetite change, chills, fever and unexpected weight change.   HENT: Negative for dental problem, sinus pressure and sneezing.    Eyes: Negative for visual disturbance.   Respiratory: Negative for cough, choking and chest tightness.    Cardiovascular: Negative for chest pain and leg swelling.   Gastrointestinal: Negative for abdominal pain, blood in stool, constipation, diarrhea and nausea.   Genitourinary: Negative for difficulty urinating and dysuria.   Musculoskeletal: Positive for arthralgias. Negative for back pain.   Skin: Negative for rash and wound.   Neurological: Positive for numbness. Negative for dizziness, light-headedness and headaches.   Hematological: Negative for  "adenopathy. Does not bruise/bleed easily.   Psychiatric/Behavioral: Negative for sleep disturbance. The patient is not nervous/anxious.        ECOG Performance Status: 0   Objective:      Vitals:   Vitals:    12/11/18 0941   BP: 115/69   Pulse: 82   Temp: 97 °F (36.1 °C)   TempSrc: Oral   SpO2: 99%   Weight: 87.4 kg (192 lb 9.2 oz)   Height: 5' 6" (1.676 m)     BMI: Body mass index is 31.08 kg/m².    Physical Exam   Constitutional: He is oriented to person, place, and time. He appears well-developed and well-nourished. No distress.   HENT:   Head: Normocephalic and atraumatic.   Eyes: Pupils are equal, round, and reactive to light. No scleral icterus.   Neck: Normal range of motion. Neck supple.   Cardiovascular: Normal rate and regular rhythm.   Pulmonary/Chest: Effort normal and breath sounds normal. No respiratory distress.   Abdominal: Soft. He exhibits no distension. There is no tenderness.   Musculoskeletal: Normal range of motion. He exhibits no edema.   Lymphadenopathy:     He has no cervical adenopathy.   Neurological: He is alert and oriented to person, place, and time. No cranial nerve deficit.   Skin: Skin is warm and dry.   Psychiatric: He has a normal mood and affect. His behavior is normal.       Laboratory Data:  Lab Visit on 12/04/2018   Component Date Value Ref Range Status    WBC 12/04/2018 8.52  3.90 - 12.70 K/uL Final    RBC 12/04/2018 5.50  4.60 - 6.20 M/uL Final    Hemoglobin 12/04/2018 17.2  14.0 - 18.0 g/dL Final    Hematocrit 12/04/2018 52.0  40.0 - 54.0 % Final    MCV 12/04/2018 95  82 - 98 fL Final    MCH 12/04/2018 31.3* 27.0 - 31.0 pg Final    MCHC 12/04/2018 33.1  32.0 - 36.0 g/dL Final    RDW 12/04/2018 13.5  11.5 - 14.5 % Final    Platelets 12/04/2018 234  150 - 350 K/uL Final    MPV 12/04/2018 10.6  9.2 - 12.9 fL Final    Gran # (ANC) 12/04/2018 5.9  1.8 - 7.7 K/uL Final    Comment: The ANC is based on a white cell differential from an   automated cell counter. It has not " been microscopically   reviewed for the presence of abnormal cells. Clinical   correlation is required.      Sodium 12/04/2018 139  136 - 145 mmol/L Final    Potassium 12/04/2018 4.4  3.5 - 5.1 mmol/L Final    Chloride 12/04/2018 106  95 - 110 mmol/L Final    CO2 12/04/2018 25  23 - 29 mmol/L Final    Glucose 12/04/2018 100  70 - 110 mg/dL Final    BUN, Bld 12/04/2018 14  8 - 23 mg/dL Final    Creatinine 12/04/2018 1.2  0.5 - 1.4 mg/dL Final    Calcium 12/04/2018 9.6  8.7 - 10.5 mg/dL Final    Total Protein 12/04/2018 7.2  6.0 - 8.4 g/dL Final    Albumin 12/04/2018 3.9  3.5 - 5.2 g/dL Final    Total Bilirubin 12/04/2018 0.4  0.1 - 1.0 mg/dL Final    Comment: For infants and newborns, interpretation of results should be based  on gestational age, weight and in agreement with clinical  observations.  Premature Infant recommended reference ranges:  Up to 24 hours.............<8.0 mg/dL  Up to 48 hours............<12.0 mg/dL  3-5 days..................<15.0 mg/dL  6-29 days.................<15.0 mg/dL      Alkaline Phosphatase 12/04/2018 94  55 - 135 U/L Final    AST 12/04/2018 13  10 - 40 U/L Final    ALT 12/04/2018 17  10 - 44 U/L Final    Anion Gap 12/04/2018 8  8 - 16 mmol/L Final    eGFR if African American 12/04/2018 >60  >60 mL/min/1.73 m^2 Final    eGFR if non African American 12/04/2018 >60  >60 mL/min/1.73 m^2 Final    Comment: Calculation used to obtain the estimated glomerular filtration  rate (eGFR) is the CKD-EPI equation.       CEA 12/04/2018 2.5  0.0 - 5.0 ng/mL Final    Comment: CEA Normal Range:  Non-Smokers: 0-3.0 ng/mL  Smokers:     0-5.0 ng/mL      Creatinine 12/04/2018 1.2  0.5 - 1.4 mg/dL Final    eGFR if African American 12/04/2018 >60  >60 mL/min/1.73 m^2 Final    eGFR if non African American 12/04/2018 >60  >60 mL/min/1.73 m^2 Final    Comment: Calculation used to obtain the estimated glomerular filtration  rate (eGFR) is the CKD-EPI equation.          Imaging:  CT  12/04/2018  EXAMINATION:  CT CHEST WITH CONTRAST; CT ABDOMEN PELVIS W WO CONTRAST    CLINICAL HISTORY:  Adenocarcinoma of sigmoid colon;Malignant neoplasm of sigmoid colon    TECHNIQUE:  Routine imaging through the chest, abdomen pelvis performed post 100 cc Omnipaque IV contrast.  Noncontrast imaging of the abdomen performed.    COMPARISON:  June 7, 2018 and December 7, 2017    FINDINGS:  Within the chest, thoracic aorta and great vessels are unchanged.  Heart is stable.  No pericardial or pleural effusion.  No axillary, mediastinal or hilar adenopathy.    Lungs demonstrate bilateral dependent atelectasis.  No concerning opacity or mass.  Stable tiny primarily subpleural sub 4 mm nodules noted within the bilateral lung apices.  Previously noted 5 mm nodule within the right upper lobe measures approximately 4.4 mm currently, sequence 10 image 136.    Within the abdomen, liver demonstrates similar appearance.  Stable right hepatic lobe lesion most suggestive of hemangioma.  Other tiny scattered hypodensities are stable as well.    Spleen and adrenal glands unchanged with small right adrenal adenoma.  Gallbladder unremarkable.  No biliary dilatation.  Pancreas unremarkable.  Kidney stable with prominent right extrarenal pelvis.    Stomach unremarkable.  Small bowel nonobstructive.  Appendix normal.  No focal colonic abnormality.    No mesenteric or retroperitoneal adenopathy.  Aorta iliac atherosclerotic calcification noted with stable dilatation of the infrarenal abdominal aorta.    Within the pelvis, urinary bladder is unremarkable.  Prostate hypertrophy in degree of mass effect on the bladder base stable.  No pelvic mass or free fluid.    No acute osseous abnormality.      Impression       No detrimental change within the chest, abdomen or pelvis with findings as above.                  Assessment:       1. Adenocarcinoma of sigmoid colon    2. 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 Tobacco use            Plan:     Mr. Lama is doing well from an oncologic standpoint and review of his CT and lab work shows no evidence recurrent disease.  I have again continued to encourage him to cut down and quit smoking.  Will plan to follow him with a CBC, CMP and CEA in 6 months and see back at that time.  We will scan in 1 year.  Report any new symptoms in the interim.  All questions were answered and he is agreeable with this plan.    Martin Knox DO, [facility]  Hematology & Oncology, [facility]  [address] [place] [address], [address]  [facility] [place]  [address]  ph. [phone]; [phone]  fax. [phone]    25 minutes were spent in coordination of patient's care, record review and counseling.  More than 50% of the time was face-to-face.

## 2019-06-10 ENCOUNTER — LAB VISIT (OUTPATIENT)
Dept: LAB | Facility: HOSPITAL | Age: 64
End: 2019-06-10
Payer: COMMERCIAL

## 2019-06-10 DIAGNOSIS — C18.7 ADENOCARCINOMA OF SIGMOID COLON: ICD-10-CM

## 2019-06-10 DIAGNOSIS — Z72.0 TOBACCO USE: ICD-10-CM

## 2019-06-10 DIAGNOSIS — C77.9 METASTATIC ADENOCARCINOMA TO LYMPH NODE: ICD-10-CM

## 2019-06-10 LAB
ALBUMIN SERPL BCP-MCNC: 3.9 G/DL (ref 3.5–5.2)
ALP SERPL-CCNC: 89 U/L (ref 55–135)
ALT SERPL W/O P-5'-P-CCNC: 18 U/L (ref 10–44)
ANION GAP SERPL CALC-SCNC: 10 MMOL/L (ref 8–16)
AST SERPL-CCNC: 14 U/L (ref 10–40)
BILIRUB SERPL-MCNC: 0.4 MG/DL (ref 0.1–1)
BUN SERPL-MCNC: 14 MG/DL (ref 8–23)
CALCIUM SERPL-MCNC: 9.8 MG/DL (ref 8.7–10.5)
CEA SERPL-MCNC: 2.7 NG/ML (ref 0–5)
CHLORIDE SERPL-SCNC: 104 MMOL/L (ref 95–110)
CO2 SERPL-SCNC: 23 MMOL/L (ref 23–29)
CREAT SERPL-MCNC: 1.1 MG/DL (ref 0.5–1.4)
ERYTHROCYTE [DISTWIDTH] IN BLOOD BY AUTOMATED COUNT: 13.4 % (ref 11.5–14.5)
EST. GFR  (AFRICAN AMERICAN): >60 ML/MIN/1.73 M^2
EST. GFR  (NON AFRICAN AMERICAN): >60 ML/MIN/1.73 M^2
GLUCOSE SERPL-MCNC: 89 MG/DL (ref 70–110)
HCT VFR BLD AUTO: 54.1 % (ref 40–54)
HGB BLD-MCNC: 17.8 G/DL (ref 14–18)
IMM GRANULOCYTES # BLD AUTO: 0.1 K/UL (ref 0–0.04)
MCH RBC QN AUTO: 31.2 PG (ref 27–31)
MCHC RBC AUTO-ENTMCNC: 32.9 G/DL (ref 32–36)
MCV RBC AUTO: 95 FL (ref 82–98)
NEUTROPHILS # BLD AUTO: 7.9 K/UL (ref 1.8–7.7)
PLATELET # BLD AUTO: 257 K/UL (ref 150–350)
PMV BLD AUTO: 10.9 FL (ref 9.2–12.9)
POTASSIUM SERPL-SCNC: 4.3 MMOL/L (ref 3.5–5.1)
PROT SERPL-MCNC: 7.2 G/DL (ref 6–8.4)
RBC # BLD AUTO: 5.71 M/UL (ref 4.6–6.2)
SODIUM SERPL-SCNC: 137 MMOL/L (ref 136–145)
WBC # BLD AUTO: 11.48 K/UL (ref 3.9–12.7)

## 2019-06-10 PROCEDURE — 82378 CARCINOEMBRYONIC ANTIGEN: CPT

## 2019-06-10 PROCEDURE — 85027 COMPLETE CBC AUTOMATED: CPT

## 2019-06-10 PROCEDURE — 80053 COMPREHEN METABOLIC PANEL: CPT

## 2019-06-10 PROCEDURE — 36415 COLL VENOUS BLD VENIPUNCTURE: CPT

## 2019-06-11 ENCOUNTER — OFFICE VISIT (OUTPATIENT)
Dept: NEUROLOGY | Facility: HOSPITAL | Age: 64
End: 2019-06-11
Attending: INTERNAL MEDICINE
Payer: COMMERCIAL

## 2019-06-11 VITALS
SYSTOLIC BLOOD PRESSURE: 143 MMHG | BODY MASS INDEX: 30.47 KG/M2 | DIASTOLIC BLOOD PRESSURE: 89 MMHG | WEIGHT: 189.63 LBS | OXYGEN SATURATION: 98 % | HEIGHT: 66 IN | HEART RATE: 60 BPM | TEMPERATURE: 97 F

## 2019-06-11 DIAGNOSIS — Z72.0 TOBACCO USE: ICD-10-CM

## 2019-06-11 DIAGNOSIS — R91.1 PULMONARY NODULE: ICD-10-CM

## 2019-06-11 DIAGNOSIS — C18.7 ADENOCARCINOMA OF SIGMOID COLON: Primary | ICD-10-CM

## 2019-06-11 PROCEDURE — 3008F PR BODY MASS INDEX (BMI) DOCUMENTED: ICD-10-PCS | Mod: CPTII,,, | Performed by: INTERNAL MEDICINE

## 2019-06-11 PROCEDURE — 99214 OFFICE O/P EST MOD 30 MIN: CPT | Mod: ,,, | Performed by: INTERNAL MEDICINE

## 2019-06-11 PROCEDURE — 3008F BODY MASS INDEX DOCD: CPT | Mod: CPTII,,, | Performed by: INTERNAL MEDICINE

## 2019-06-11 PROCEDURE — 99214 PR OFFICE/OUTPT VISIT, EST, LEVL IV, 30-39 MIN: ICD-10-PCS | Mod: ,,, | Performed by: INTERNAL MEDICINE

## 2019-06-11 PROCEDURE — 99214 OFFICE O/P EST MOD 30 MIN: CPT | Performed by: INTERNAL MEDICINE

## 2019-06-11 NOTE — PROGRESS NOTES
NOLANETS:  [facility]  A collaboration between [facility] and [facility]    PATIENT: Stu Lama  MRN: [id number]  DATE: 6/11/2019      Diagnosis:   1. Adenocarcinoma of sigmoid colon    2. Tobacco use    3. Pulmonary nodule        Chief Complaint: Follow-up      Oncologic History:      Oncologic History Sigmoid adenocarcinoma diagnosed 11/2015    Oncologic Treatment Low anterior resection 12/14/2015  XELOX initiated 2/23/16 (Oxaliplatin discontinued following 1 cycle due to severe neuropathy; Capecitabine dose reduced by 25% due to tremors). completed 8/2016     Pathology Moderately differentiated adenocarcinoma, positive lymphovascular invasion, positive perineural invasion, pT3, N1a          Subjective:    Interval History: Mr. Lama is a 63 y.o. male seen in follow up for colon cancer.  He states he has been feeling well. He has developed a left rotator cuff tear and has some pain and decreased mobility because of this.  He has followed up with Orthopedics who wish to avoid surgery at this time.  He denies any abdominal pain.  He continues to smoke.  No other new complaints.    His history dates to 11/15 when he underwent colonoscopy after having several weeks of abdominal pain and weight loss.  At that time he had also complained of intermittent hematochezia.  He was found to have a fungating partially obstructing mass in the sigmoid colon.  On 12/13/15 he underwent a low anterior resection.  Pathology from this revealed a moderately differentiated adenocarcinoma, 4 cm, lymphovascular invasion and perineural invasion with 1 out of 17 lymph nodes positive for metastatic disease. He was pathologically staged T3, N1.  He had followed up with oncology initially, however, wanted a second opinion at [facility].  He was initiated on XELOX on 2/23/16 and required capecitabine dose reduction and oxaliplatin discontinuation  following 1 cycle.  He completed 6 months of treatment in 8/2016.    Past Medical History:   Past Medical History:   Diagnosis Date    Adenocarcinoma of sigmoid colon 2/16/2016    Chemotherapy follow-up examination 4/5/2016    Hyperlipidemia     Liver lesion 9/6/2016    Metastatic adenocarcinoma to lymph node 2/16/2016    Neuropathy due to chemotherapeutic drug 3/15/2016    Pulmonary nodule 3/15/2016    Rash 5/17/2016    Tobacco use 2/16/2016       Past Surgical HIstory:   Past Surgical History:   Procedure Laterality Date    COLON SURGERY      KNEE ARTHROSCOPY W/ DEBRIDEMENT      PROSTATE SURGERY      TONSILLECTOMY         Family History:   Family History   Problem Relation Age of Onset    Cancer Mother         Breast       Social History:  reports that he has been smoking cigarettes.  He has a 112.50 pack-year smoking history. He does not have any smokeless tobacco history on file. He reports that he drinks about 0.6 oz of alcohol per week.    Allergies:  Review of patient's allergies indicates:   Allergen Reactions    Rice Nausea And Vomiting       Medications:  No current outpatient medications on file.     No current facility-administered medications for this visit.        Review of Systems   Constitutional: Negative for appetite change, chills, fatigue, fever and unexpected weight change.   HENT: Negative for dental problem, sinus pressure and sneezing.    Eyes: Negative for visual disturbance.   Respiratory: Negative for cough, choking and chest tightness.    Cardiovascular: Negative for chest pain and leg swelling.   Gastrointestinal: Negative for abdominal pain, blood in stool, constipation, diarrhea and nausea.   Genitourinary: Negative for difficulty urinating and dysuria.   Musculoskeletal: Positive for arthralgias. Negative for back pain.   Skin: Negative for rash and wound.   Neurological: Positive for numbness. Negative for dizziness, light-headedness and headaches.   Hematological:  "Negative for adenopathy. Does not bruise/bleed easily.   Psychiatric/Behavioral: Negative for sleep disturbance. The patient is not nervous/anxious.        ECOG Performance Status: 0   Objective:      Vitals:   Vitals:    06/11/19 0918   BP: (!) 143/89   Pulse: 60   Temp: 96.9 °F (36.1 °C)   SpO2: 98%   Weight: 86 kg (189 lb 9.5 oz)   Height: 5' 6" (1.676 m)     BMI: Body mass index is 30.6 kg/m².    Physical Exam   Constitutional: He is oriented to person, place, and time. He appears well-developed and well-nourished. No distress.   HENT:   Head: Normocephalic and atraumatic.   Eyes: Pupils are equal, round, and reactive to light. No scleral icterus.   Neck: Normal range of motion. Neck supple.   Cardiovascular: Normal rate and regular rhythm.   Pulmonary/Chest: Effort normal and breath sounds normal. No respiratory distress.   Abdominal: Soft. He exhibits no distension. There is no tenderness.   Musculoskeletal: Normal range of motion. He exhibits no edema.   Lymphadenopathy:     He has no cervical adenopathy.   Neurological: He is alert and oriented to person, place, and time. No cranial nerve deficit.   Skin: Skin is warm and dry.   Psychiatric: He has a normal mood and affect. His behavior is normal.       Laboratory Data:  Lab Visit on 06/10/2019   Component Date Value Ref Range Status    WBC 06/10/2019 11.48  3.90 - 12.70 K/uL Final    RBC 06/10/2019 5.71  4.60 - 6.20 M/uL Final    Hemoglobin 06/10/2019 17.8  14.0 - 18.0 g/dL Final    Hematocrit 06/10/2019 54.1* 40.0 - 54.0 % Final    Mean Corpuscular Volume 06/10/2019 95  82 - 98 fL Final    Mean Corpuscular Hemoglobin 06/10/2019 31.2* 27.0 - 31.0 pg Final    Mean Corpuscular Hemoglobin Conc 06/10/2019 32.9  32.0 - 36.0 g/dL Final    RDW 06/10/2019 13.4  11.5 - 14.5 % Final    Platelets 06/10/2019 257  150 - 350 K/uL Final    MPV 06/10/2019 10.9  9.2 - 12.9 fL Final    Gran # (ANC) 06/10/2019 7.9* 1.8 - 7.7 K/uL Final    Comment: The ANC is based " on a white cell differential from an   automated cell counter. It has not been microscopically   reviewed for the presence of abnormal cells. Clinical   correlation is required.      Immature Grans (Abs) 06/10/2019 0.10* 0.00 - 0.04 K/uL Final    Comment: Mild elevation in immature granulocytes is non specific and   can be seen in a variety of conditions including stress response,   acute inflammation, trauma and pregnancy. Correlation with other   laboratory and clinical findings is essential.      Sodium 06/10/2019 137  136 - 145 mmol/L Final    Potassium 06/10/2019 4.3  3.5 - 5.1 mmol/L Final    Chloride 06/10/2019 104  95 - 110 mmol/L Final    CO2 06/10/2019 23  23 - 29 mmol/L Final    Glucose 06/10/2019 89  70 - 110 mg/dL Final    BUN, Bld 06/10/2019 14  8 - 23 mg/dL Final    Creatinine 06/10/2019 1.1  0.5 - 1.4 mg/dL Final    Calcium 06/10/2019 9.8  8.7 - 10.5 mg/dL Final    Total Protein 06/10/2019 7.2  6.0 - 8.4 g/dL Final    Albumin 06/10/2019 3.9  3.5 - 5.2 g/dL Final    Total Bilirubin 06/10/2019 0.4  0.1 - 1.0 mg/dL Final    Comment: For infants and newborns, interpretation of results should be based  on gestational age, weight and in agreement with clinical  observations.  Premature Infant recommended reference ranges:  Up to 24 hours.............<8.0 mg/dL  Up to 48 hours............<12.0 mg/dL  3-5 days..................<15.0 mg/dL  6-29 days.................<15.0 mg/dL      Alkaline Phosphatase 06/10/2019 89  55 - 135 U/L Final    AST 06/10/2019 14  10 - 40 U/L Final    ALT 06/10/2019 18  10 - 44 U/L Final    Anion Gap 06/10/2019 10  8 - 16 mmol/L Final    eGFR if African American 06/10/2019 >60.0  >60 mL/min/1.73 m^2 Final    eGFR if non African American 06/10/2019 >60.0  >60 mL/min/1.73 m^2 Final    Comment: Calculation used to obtain the estimated glomerular filtration  rate (eGFR) is the CKD-EPI equation.       CEA 06/10/2019 2.7  0.0 - 5.0 ng/mL Final    Comment: CEA Normal  Range:  Non-Smokers: 0-3.0 ng/mL  Smokers:     0-5.0 ng/mL         Imaging:  CT 12/04/2018  EXAMINATION:  CT CHEST WITH CONTRAST; CT ABDOMEN PELVIS W WO CONTRAST    CLINICAL HISTORY:  Adenocarcinoma of sigmoid colon;Malignant neoplasm of sigmoid colon    TECHNIQUE:  Routine imaging through the chest, abdomen pelvis performed post 100 cc Omnipaque IV contrast.  Noncontrast imaging of the abdomen performed.    COMPARISON:  June 7, 2018 and December 7, 2017    FINDINGS:  Within the chest, thoracic aorta and great vessels are unchanged.  Heart is stable.  No pericardial or pleural effusion.  No axillary, mediastinal or hilar adenopathy.    Lungs demonstrate bilateral dependent atelectasis.  No concerning opacity or mass.  Stable tiny primarily subpleural sub 4 mm nodules noted within the bilateral lung apices.  Previously noted 5 mm nodule within the right upper lobe measures approximately 4.4 mm currently, sequence 10 image 136.    Within the abdomen, liver demonstrates similar appearance.  Stable right hepatic lobe lesion most suggestive of hemangioma.  Other tiny scattered hypodensities are stable as well.    Spleen and adrenal glands unchanged with small right adrenal adenoma.  Gallbladder unremarkable.  No biliary dilatation.  Pancreas unremarkable.  Kidney stable with prominent right extrarenal pelvis.    Stomach unremarkable.  Small bowel nonobstructive.  Appendix normal.  No focal colonic abnormality.    No mesenteric or retroperitoneal adenopathy.  Aorta iliac atherosclerotic calcification noted with stable dilatation of the infrarenal abdominal aorta.    Within the pelvis, urinary bladder is unremarkable.  Prostate hypertrophy in degree of mass effect on the bladder base stable.  No pelvic mass or free fluid.    No acute osseous abnormality.      Impression       No detrimental change within the chest, abdomen or pelvis with findings as above.                  Assessment:       1. Adenocarcinoma of sigmoid  colon    2. Tobacco use    3. Pulmonary nodule           Plan:     Mr. Lama continues to do well from an oncologic standpoint.  His CEA is normal.  Prior CT was also normal. I have again encouraged him to quit smoking and have noted in increased hematocrit and discussed this with him.  We will plan to repeat labs and images in 6 months and see back at that time.  All questions were answered and he is agreeable with this plan.    Martin Knox DO, FACP  Hematology & Oncology, [facility]  [address] [place] [address], [address]  [place]  [address]  ph. [phone]; [phone]  fax. [phone]    25 minutes were spent in coordination of patient's care, record review and counseling.  More than 50% of the time was face-to-face.

## 2019-06-11 NOTE — LETTER
June 11, 2019        Juan Church MD  [address]  [place] [address]             [facility] [address]  [place] [address]  Phone: [phone]  Fax: [phone]   Patient: Stu Lama   MR Number: [id number]   YOB: 1955   Date of Visit: 6/11/2019       Dear Dr. Chruch:    Thank you for referring Stu Lama to me for evaluation. Attached you will find relevant portions of my assessment and plan of care.    If you have questions, please do not hesitate to call me. I look forward to following Stu Lama along with you.    Sincerely,      Martin Knox, DO, FACP            CC  No Recipients    Enclosure

## 2019-11-25 ENCOUNTER — TELEPHONE (OUTPATIENT)
Dept: NEUROLOGY | Facility: HOSPITAL | Age: 64
End: 2019-11-25

## 2019-11-25 DIAGNOSIS — C18.7 ADENOCARCINOMA OF SIGMOID COLON: Primary | ICD-10-CM

## 2019-11-25 NOTE — TELEPHONE ENCOUNTER
----- Message from RT Jenny sent at 11/25/2019 10:25 AM CST -----  Regarding: pt needs STAT creatinine order  Mr. Lama has a CMP order in but the priority is ROUTINE.  We will not get routine labs back in time for her CT appointment.   Please order CMP STAT.     Please contact patient to make them aware of the changes. We can not do patient until lab results are received.     Patients 60 years and older must have labs within a 30 day window.         Thanks Leonor Cano   Please call with any question [phone]

## 2019-11-27 ENCOUNTER — TELEPHONE (OUTPATIENT)
Dept: RADIOLOGY | Facility: HOSPITAL | Age: 64
End: 2019-11-27

## 2019-12-02 ENCOUNTER — HOSPITAL ENCOUNTER (OUTPATIENT)
Dept: RADIOLOGY | Facility: HOSPITAL | Age: 64
Discharge: HOME | End: 2019-12-02
Attending: INTERNAL MEDICINE
Payer: COMMERCIAL

## 2019-12-02 DIAGNOSIS — R91.1 PULMONARY NODULE: ICD-10-CM

## 2019-12-02 DIAGNOSIS — C18.7 ADENOCARCINOMA OF SIGMOID COLON: ICD-10-CM

## 2019-12-02 DIAGNOSIS — Z72.0 TOBACCO USE: ICD-10-CM

## 2019-12-02 PROCEDURE — 71260 CT THORAX DX C+: CPT | Mod: 26,,, | Performed by: RADIOLOGY

## 2019-12-02 PROCEDURE — 74177 CT ABD & PELVIS W/CONTRAST: CPT | Mod: 26,,, | Performed by: RADIOLOGY

## 2019-12-02 PROCEDURE — 74177 CT ABD & PELVIS W/CONTRAST: CPT | Mod: TC

## 2019-12-02 PROCEDURE — 74177 CT CHEST ABDOMEN PELVIS WITH CONTRAST (XPD): ICD-10-PCS | Mod: 26,,, | Performed by: RADIOLOGY

## 2019-12-02 PROCEDURE — 71260 CT CHEST ABDOMEN PELVIS WITH CONTRAST (XPD): ICD-10-PCS | Mod: 26,,, | Performed by: RADIOLOGY

## 2019-12-02 PROCEDURE — 25500020 PHARM REV CODE 255: Performed by: INTERNAL MEDICINE

## 2019-12-02 RX ADMIN — IOHEXOL 100 ML: 350 INJECTION, SOLUTION INTRAVENOUS at 09:12

## 2019-12-03 ENCOUNTER — OFFICE VISIT (OUTPATIENT)
Dept: NEUROLOGY | Facility: HOSPITAL | Age: 64
End: 2019-12-03
Attending: INTERNAL MEDICINE
Payer: COMMERCIAL

## 2019-12-03 VITALS
HEART RATE: 70 BPM | DIASTOLIC BLOOD PRESSURE: 77 MMHG | BODY MASS INDEX: 31.23 KG/M2 | OXYGEN SATURATION: 99 % | SYSTOLIC BLOOD PRESSURE: 133 MMHG | TEMPERATURE: 97 F | WEIGHT: 194.31 LBS | HEIGHT: 66 IN

## 2019-12-03 DIAGNOSIS — T45.1X5A NEUROPATHY DUE TO CHEMOTHERAPEUTIC DRUG: ICD-10-CM

## 2019-12-03 DIAGNOSIS — R91.1 PULMONARY NODULE: ICD-10-CM

## 2019-12-03 DIAGNOSIS — I71.40 ABDOMINAL AORTIC ANEURYSM (AAA) 3.0 CM TO 5.5 CM IN DIAMETER IN MALE: ICD-10-CM

## 2019-12-03 DIAGNOSIS — C18.7 ADENOCARCINOMA OF SIGMOID COLON: Primary | ICD-10-CM

## 2019-12-03 DIAGNOSIS — Z72.0 TOBACCO USE: ICD-10-CM

## 2019-12-03 DIAGNOSIS — G62.0 NEUROPATHY DUE TO CHEMOTHERAPEUTIC DRUG: ICD-10-CM

## 2019-12-03 PROCEDURE — 1101F PT FALLS ASSESS-DOCD LE1/YR: CPT | Mod: CPTII,,, | Performed by: INTERNAL MEDICINE

## 2019-12-03 PROCEDURE — 99214 PR OFFICE/OUTPT VISIT, EST, LEVL IV, 30-39 MIN: ICD-10-PCS | Mod: ,,, | Performed by: INTERNAL MEDICINE

## 2019-12-03 PROCEDURE — 1101F PR PT FALLS ASSESS DOC 0-1 FALLS W/OUT INJ PAST YR: ICD-10-PCS | Mod: CPTII,,, | Performed by: INTERNAL MEDICINE

## 2019-12-03 PROCEDURE — 99214 OFFICE O/P EST MOD 30 MIN: CPT | Mod: ,,, | Performed by: INTERNAL MEDICINE

## 2019-12-03 PROCEDURE — 3008F PR BODY MASS INDEX (BMI) DOCUMENTED: ICD-10-PCS | Mod: CPTII,,, | Performed by: INTERNAL MEDICINE

## 2019-12-03 PROCEDURE — 3008F BODY MASS INDEX DOCD: CPT | Mod: CPTII,,, | Performed by: INTERNAL MEDICINE

## 2019-12-03 PROCEDURE — 99214 OFFICE O/P EST MOD 30 MIN: CPT | Performed by: INTERNAL MEDICINE

## 2019-12-03 NOTE — PROGRESS NOTES
NOLANETS:  [facility]  A collaboration between [facility] and [facility]    PATIENT: Stu Lama  MRN: [id number]  DATE: 12/3/2019      Diagnosis:   1. Adenocarcinoma of sigmoid colon    2. Pulmonary nodule    3. Tobacco use    4. Neuropathy due to chemotherapeutic drug    5. Abdominal aortic aneurysm (AAA) 3.0 cm to 5.5 cm in diameter in male        Chief Complaint: Follow-up (6 month follow up)      Oncologic History:      Oncologic History Sigmoid adenocarcinoma diagnosed 11/2015    Oncologic Treatment Low anterior resection 12/14/2015  XELOX initiated 2/23/16 (Oxaliplatin discontinued following 1 cycle due to severe neuropathy; Capecitabine dose reduced by 25% due to tremors). completed 8/2016     Pathology Moderately differentiated adenocarcinoma, positive lymphovascular invasion, positive perineural invasion, pT3, N1a          Subjective:    Interval History: Mr. Lama is a 64 y.o. male seen in follow up for colon cancer.  He states he has been feeling well. Prior rotator cuff pain has resolved.  He still has numbness to his hands but this does not affect his working.  He has no other new complaints.    His history dates to 11/15 when he underwent colonoscopy after having several weeks of abdominal pain and weight loss.  At that time he had also complained of intermittent hematochezia.  He was found to have a fungating partially obstructing mass in the sigmoid colon.  On 12/13/15 he underwent a low anterior resection.  Pathology from this revealed a moderately differentiated adenocarcinoma, 4 cm, lymphovascular invasion and perineural invasion with 1 out of 17 lymph nodes positive for metastatic disease. He was pathologically staged T3, N1.  He had followed up with oncology initially, however, wanted a second opinion at [facility].  He was initiated on XELOX on 2/23/16 and required capecitabine dose reduction and  oxaliplatin discontinuation following 1 cycle.  He completed 6 months of treatment in 8/2016.    Past Medical History:   Past Medical History:   Diagnosis Date    Abdominal aortic aneurysm (AAA) 3.0 cm to 5.5 cm in diameter in male 12/3/2019    Adenocarcinoma of sigmoid colon 2/16/2016    Chemotherapy follow-up examination 4/5/2016    Hyperlipidemia     Liver lesion 9/6/2016    Metastatic adenocarcinoma to lymph node 2/16/2016    Neuropathy due to chemotherapeutic drug 3/15/2016    Pulmonary nodule 3/15/2016    Rash 5/17/2016    Tobacco use 2/16/2016       Past Surgical HIstory:   Past Surgical History:   Procedure Laterality Date    COLON SURGERY      KNEE ARTHROSCOPY W/ DEBRIDEMENT      PROSTATE SURGERY      TONSILLECTOMY         Family History:   Family History   Problem Relation Age of Onset    Cancer Mother         Breast       Social History:  reports that he has been smoking cigarettes. He has a 112.50 pack-year smoking history. He does not have any smokeless tobacco history on file. He reports that he drinks about 1.0 standard drinks of alcohol per week.    Allergies:  Review of patient's allergies indicates:   Allergen Reactions    Rice Nausea And Vomiting       Medications:  No current outpatient medications on file.     No current facility-administered medications for this visit.        Review of Systems   Constitutional: Negative for appetite change, chills, fatigue, fever and unexpected weight change.   HENT: Negative for dental problem, sinus pressure and sneezing.    Eyes: Negative for visual disturbance.   Respiratory: Negative for cough, choking and chest tightness.    Cardiovascular: Negative for chest pain and leg swelling.   Gastrointestinal: Negative for abdominal pain, blood in stool, constipation, diarrhea and nausea.   Genitourinary: Negative for difficulty urinating and dysuria.   Musculoskeletal: Positive for arthralgias. Negative for back pain.   Skin: Negative for rash and  "wound.   Neurological: Positive for numbness. Negative for dizziness, light-headedness and headaches.   Hematological: Negative for adenopathy. Does not bruise/bleed easily.   Psychiatric/Behavioral: Negative for sleep disturbance. The patient is not nervous/anxious.        ECOG Performance Status: 0   Objective:      Vitals:   Vitals:    12/03/19 0927   BP: 133/77   BP Location: Right arm   Patient Position: Sitting   BP Method: Large (Automatic)   Pulse: 70   Temp: 97 °F (36.1 °C)   TempSrc: Oral   SpO2: 99%   Weight: 88.2 kg (194 lb 5.4 oz)   Height: 5' 6" (1.676 m)     BMI: Body mass index is 31.37 kg/m².    Physical Exam   Constitutional: He is oriented to person, place, and time. He appears well-developed and well-nourished. No distress.   HENT:   Head: Normocephalic and atraumatic.   Eyes: Pupils are equal, round, and reactive to light. No scleral icterus.   Neck: Normal range of motion. Neck supple.   Cardiovascular: Normal rate and regular rhythm.   Pulmonary/Chest: Effort normal and breath sounds normal. No respiratory distress.   Abdominal: Soft. He exhibits no distension. There is no tenderness.   Musculoskeletal: Normal range of motion. He exhibits no edema.   Lymphadenopathy:     He has no cervical adenopathy.   Neurological: He is alert and oriented to person, place, and time. No cranial nerve deficit.   Skin: Skin is warm and dry.   Psychiatric: He has a normal mood and affect. His behavior is normal.       Laboratory Data:  Lab Visit on 12/02/2019   Component Date Value Ref Range Status    WBC 12/02/2019 10.86  3.90 - 12.70 K/uL Final    RBC 12/02/2019 5.71  4.60 - 6.20 M/uL Final    Hemoglobin 12/02/2019 18.0  14.0 - 18.0 g/dL Final    Hematocrit 12/02/2019 54.4* 40.0 - 54.0 % Final    Mean Corpuscular Volume 12/02/2019 95  82 - 98 fL Final    Mean Corpuscular Hemoglobin 12/02/2019 31.5* 27.0 - 31.0 pg Final    Mean Corpuscular Hemoglobin Conc 12/02/2019 33.1  32.0 - 36.0 g/dL Final    RDW " 12/02/2019 13.2  11.5 - 14.5 % Final    Platelets 12/02/2019 293  150 - 350 K/uL Final    MPV 12/02/2019 10.4  9.2 - 12.9 fL Final    Gran # (ANC) 12/02/2019 7.5  1.8 - 7.7 K/uL Final    Comment: The ANC is based on a white cell differential from an   automated cell counter. It has not been microscopically   reviewed for the presence of abnormal cells. Clinical   correlation is required.      Immature Grans (Abs) 12/02/2019 0.08* 0.00 - 0.04 K/uL Final    Comment: Mild elevation in immature granulocytes is non specific and   can be seen in a variety of conditions including stress response,   acute inflammation, trauma and pregnancy. Correlation with other   laboratory and clinical findings is essential.      CEA 12/02/2019 2.3  0.0 - 5.0 ng/mL Final    Comment: CEA Normal Range:  Non-Smokers: 0-3.0 ng/mL  Smokers:     0-5.0 ng/mL      Sodium 12/02/2019 139  136 - 145 mmol/L Final    Potassium 12/02/2019 4.4  3.5 - 5.1 mmol/L Final    Chloride 12/02/2019 103  95 - 110 mmol/L Final    CO2 12/02/2019 26  23 - 29 mmol/L Final    Glucose 12/02/2019 99  70 - 110 mg/dL Final    BUN, Bld 12/02/2019 16  8 - 23 mg/dL Final    Creatinine 12/02/2019 1.3  0.5 - 1.4 mg/dL Final    Calcium 12/02/2019 9.8  8.7 - 10.5 mg/dL Final    Total Protein 12/02/2019 7.5  6.0 - 8.4 g/dL Final    Albumin 12/02/2019 4.1  3.5 - 5.2 g/dL Final    Total Bilirubin 12/02/2019 0.4  0.1 - 1.0 mg/dL Final    Comment: For infants and newborns, interpretation of results should be based  on gestational age, weight and in agreement with clinical  observations.  Premature Infant recommended reference ranges:  Up to 24 hours.............<8.0 mg/dL  Up to 48 hours............<12.0 mg/dL  3-5 days..................<15.0 mg/dL  6-29 days.................<15.0 mg/dL      Alkaline Phosphatase 12/02/2019 90  55 - 135 U/L Final    AST 12/02/2019 14  10 - 40 U/L Final    ALT 12/02/2019 20  10 - 44 U/L Final    Anion Gap 12/02/2019 10  8 - 16  mmol/L Final    eGFR if African American 12/02/2019 >60  >60 mL/min/1.73 m^2 Final    eGFR if non African American 12/02/2019 57* >60 mL/min/1.73 m^2 Final    Comment: Calculation used to obtain the estimated glomerular filtration  rate (eGFR) is the CKD-EPI equation.          Imaging:  CT 12/02/2019    COMPARISON:  12/04/2018 CT chest abdomen and pelvis    FINDINGS:  CHEST    No infiltrates or pleural effusions are identified.  There are multiple stable predominantly subpleural less than 5 mm nodules noted within either upper lung zone.  There is also a stable 6-7 mm noncalcified pulmonary nodule within the right lower lobe near the right lung base series 7, image 346.    Intramuscular lipoma seen within the upper right back.    Minimal vascular calcification seen involving the aorta.  There is no pericardial effusion.  No enlarged mediastinal, hilar or axillary lymph nodes are identified.    ABDOMEN    Liver/gallbladder/biliary: There is a lesion within the posterior segment of the right hepatic lobe that demonstrates peripheral nodular enhancement and near complete fill-in most likely representing an angioma.  There also at least 3 additional nonenhancing hypodensities within the medial segment of the left hepatic lobe and anterior segment of the right hepatic lobe most consistent likely representing tiny cysts and stable when compared to the prior exam.  The gallbladder is present and unremarkable.  No biliary ductal dilation.    Pancreas: The pancreas is unremarkable in appearance.    Spleen: The spleen is not enlarged.    Adrenals: Stable appearance of an adenoma in the central portion of the right adrenal gland.    Kidneys: The kidneys are equally perfused and demonstrate no solid masses. No nephrolithiasis. Prominent extrarenal pelvis seen on the right.    Bowel/Mesentery: There is no evidence of bowel obstruction. Postoperative change noted in the region of the rectosigmoid colon.  No mesenteric stranding  or adenopathy.    Retroperitoneum: No adenopathy.  There is a stable infrarenal abdominal aortic aneurysm that measures up to 3.1 cm.    PELVIS    Genitourinary/Reproductive organs: The urinary bladder wall demonstrates some mild circumferential wall thickening which may be post treatment related and similar in appearance to the prior exam.  There is some more focal enhancement seen near the base of the bladder centrally which may be related to prostatic hypertrophy but similar in appearance to the prior exam.    Adenopathy: None    Free Fluid: No free fluid    Osseus Structures/Soft tissues: No suspicious appearing osseus lesions. No significant soft tissue abnormality.      Impression       1. Stable appearance of the chest, abdomen and pelvis with no new lesions identified.  No change in the appearance of the previously described abnormalities.                  Assessment:       1. Adenocarcinoma of sigmoid colon    2. Pulmonary nodule    3. Tobacco use    4. Neuropathy due to chemotherapeutic drug    5. Abdominal aortic aneurysm (AAA) 3.0 cm to 5.5 cm in diameter in male           Plan:     Mr. Lama is doing well from an oncologic standpoint.  Review of his CT shows no evidence of recurrent disease.  He does have multiple pulmonary nodules which are stable and we will continue to monitor.  I have again continued to encourage smoking cessation.  Additionally, he does have an aortic aneurysm that was not commented on on prior images but stable.  He is already seen vascular surgery for this and he will continue to follow with them.  His neuropathy is unchanged and does not affect his lifestyle.  I will plan to see him back in another 6 months with labs.  He will report any new symptoms in the interim.  All questions were answered and he is agreeable with this plan.    Martin Knox DO, FACP  Hematology & Oncology, [facility]  [address] [place] [address]., [address]  [place]  [address]  ph.  [phone]; [phone]  fax. [phone]    25 minutes were spent in coordination of patient's care, record review and counseling.  More than 50% of the time was face-to-face.

## 2021-04-07 ENCOUNTER — TELEPHONE (OUTPATIENT)
Dept: NEUROLOGY | Facility: HOSPITAL | Age: 66
End: 2021-04-07